# Patient Record
Sex: MALE | Race: WHITE | Employment: OTHER | URBAN - METROPOLITAN AREA
[De-identification: names, ages, dates, MRNs, and addresses within clinical notes are randomized per-mention and may not be internally consistent; named-entity substitution may affect disease eponyms.]

---

## 2020-07-21 PROBLEM — H40.10X0: Status: ACTIVE | Noted: 2020-07-21

## 2020-09-25 PROCEDURE — 88304 TISSUE EXAM BY PATHOLOGIST: CPT | Performed by: ORTHOPAEDIC SURGERY

## 2021-03-01 PROBLEM — R01.1 HEART MURMUR, SYSTOLIC: Status: ACTIVE | Noted: 2021-03-01

## 2021-03-01 PROBLEM — I71.40 ABDOMINAL AORTIC ANEURYSM (AAA) WITHOUT RUPTURE (HCC): Status: ACTIVE | Noted: 2021-03-01

## 2021-03-01 PROBLEM — I71.40 ABDOMINAL AORTIC ANEURYSM (AAA) WITHOUT RUPTURE: Status: ACTIVE | Noted: 2021-03-01

## 2021-03-01 PROBLEM — I71.4 ABDOMINAL AORTIC ANEURYSM (AAA) WITHOUT RUPTURE (HCC): Status: ACTIVE | Noted: 2021-03-01

## 2021-07-06 ENCOUNTER — HOSPITAL ENCOUNTER (INPATIENT)
Facility: HOSPITAL | Age: 81
LOS: 2 days | Discharge: HOME | DRG: 243 | End: 2021-07-08
Attending: EMERGENCY MEDICINE | Admitting: INTERNAL MEDICINE
Payer: MEDICARE

## 2021-07-06 ENCOUNTER — APPOINTMENT (OUTPATIENT)
Dept: GENERAL RADIOLOGY | Facility: HOSPITAL | Age: 81
DRG: 243 | End: 2021-07-06
Attending: EMERGENCY MEDICINE
Payer: MEDICARE

## 2021-07-06 DIAGNOSIS — I45.5 SINUS PAUSE: Primary | ICD-10-CM

## 2021-07-06 DIAGNOSIS — J44.9 CHRONIC OBSTRUCTIVE PULMONARY DISEASE, UNSPECIFIED COPD TYPE (HCC): ICD-10-CM

## 2021-07-06 DIAGNOSIS — R55 SYNCOPE, UNSPECIFIED SYNCOPE TYPE: ICD-10-CM

## 2021-07-06 DIAGNOSIS — I10 BENIGN ESSENTIAL HTN: ICD-10-CM

## 2021-07-06 LAB
ALBUMIN SERPL-MCNC: 3.6 G/DL (ref 3.4–5)
ALBUMIN/GLOB SERPL: 1.1 {RATIO} (ref 1–2)
ALP LIVER SERPL-CCNC: 74 U/L
ALT SERPL-CCNC: 16 U/L
ANION GAP SERPL CALC-SCNC: 6 MMOL/L (ref 0–18)
AST SERPL-CCNC: 17 U/L (ref 15–37)
BASOPHILS # BLD AUTO: 0.04 X10(3) UL (ref 0–0.2)
BASOPHILS NFR BLD AUTO: 0.7 %
BILIRUB SERPL-MCNC: 0.4 MG/DL (ref 0.1–2)
BUN BLD-MCNC: 20 MG/DL (ref 7–18)
BUN/CREAT SERPL: 10 (ref 10–20)
CALCIUM BLD-MCNC: 9.3 MG/DL (ref 8.5–10.1)
CHLORIDE SERPL-SCNC: 104 MMOL/L (ref 98–112)
CO2 SERPL-SCNC: 28 MMOL/L (ref 21–32)
CREAT BLD-MCNC: 2 MG/DL
DEPRECATED RDW RBC AUTO: 45.6 FL (ref 35.1–46.3)
EOSINOPHIL # BLD AUTO: 0.24 X10(3) UL (ref 0–0.7)
EOSINOPHIL NFR BLD AUTO: 4 %
ERYTHROCYTE [DISTWIDTH] IN BLOOD BY AUTOMATED COUNT: 12.2 % (ref 11–15)
GLOBULIN PLAS-MCNC: 3.4 G/DL (ref 2.8–4.4)
GLUCOSE BLD-MCNC: 110 MG/DL (ref 70–99)
HCT VFR BLD AUTO: 40.4 %
HGB BLD-MCNC: 13.9 G/DL
IMM GRANULOCYTES # BLD AUTO: 0.02 X10(3) UL (ref 0–1)
IMM GRANULOCYTES NFR BLD: 0.3 %
LYMPHOCYTES # BLD AUTO: 1.16 X10(3) UL (ref 1–4)
LYMPHOCYTES NFR BLD AUTO: 19.5 %
M PROTEIN MFR SERPL ELPH: 7 G/DL (ref 6.4–8.2)
MCH RBC QN AUTO: 34.8 PG (ref 26–34)
MCHC RBC AUTO-ENTMCNC: 34.4 G/DL (ref 31–37)
MCV RBC AUTO: 101 FL
MONOCYTES # BLD AUTO: 0.51 X10(3) UL (ref 0.1–1)
MONOCYTES NFR BLD AUTO: 8.6 %
NEUTROPHILS # BLD AUTO: 3.97 X10 (3) UL (ref 1.5–7.7)
NEUTROPHILS # BLD AUTO: 3.97 X10(3) UL (ref 1.5–7.7)
NEUTROPHILS NFR BLD AUTO: 66.9 %
OSMOLALITY SERPL CALC.SUM OF ELEC: 289 MOSM/KG (ref 275–295)
PLATELET # BLD AUTO: 273 10(3)UL (ref 150–450)
POTASSIUM SERPL-SCNC: 4.7 MMOL/L (ref 3.5–5.1)
RBC # BLD AUTO: 4 X10(6)UL
SARS-COV-2 RNA RESP QL NAA+PROBE: NOT DETECTED
SODIUM SERPL-SCNC: 138 MMOL/L (ref 136–145)
TROPONIN I SERPL-MCNC: <0.045 NG/ML (ref ?–0.04)
WBC # BLD AUTO: 5.9 X10(3) UL (ref 4–11)

## 2021-07-06 PROCEDURE — 96361 HYDRATE IV INFUSION ADD-ON: CPT

## 2021-07-06 PROCEDURE — 85025 COMPLETE CBC W/AUTO DIFF WBC: CPT | Performed by: EMERGENCY MEDICINE

## 2021-07-06 PROCEDURE — 99285 EMERGENCY DEPT VISIT HI MDM: CPT

## 2021-07-06 PROCEDURE — 80053 COMPREHEN METABOLIC PANEL: CPT | Performed by: EMERGENCY MEDICINE

## 2021-07-06 PROCEDURE — 84484 ASSAY OF TROPONIN QUANT: CPT | Performed by: EMERGENCY MEDICINE

## 2021-07-06 PROCEDURE — 93005 ELECTROCARDIOGRAM TRACING: CPT

## 2021-07-06 PROCEDURE — 96360 HYDRATION IV INFUSION INIT: CPT

## 2021-07-06 PROCEDURE — 93010 ELECTROCARDIOGRAM REPORT: CPT

## 2021-07-06 PROCEDURE — 71045 X-RAY EXAM CHEST 1 VIEW: CPT | Performed by: EMERGENCY MEDICINE

## 2021-07-06 RX ORDER — DOCUSATE SODIUM 100 MG/1
100 CAPSULE, LIQUID FILLED ORAL 2 TIMES DAILY
Status: DISCONTINUED | OUTPATIENT
Start: 2021-07-06 | End: 2021-07-08

## 2021-07-06 RX ORDER — ONDANSETRON 2 MG/ML
4 INJECTION INTRAMUSCULAR; INTRAVENOUS EVERY 6 HOURS PRN
Status: DISCONTINUED | OUTPATIENT
Start: 2021-07-06 | End: 2021-07-07

## 2021-07-06 RX ORDER — SODIUM CHLORIDE 9 MG/ML
125 INJECTION, SOLUTION INTRAVENOUS CONTINUOUS
Status: DISCONTINUED | OUTPATIENT
Start: 2021-07-06 | End: 2021-07-07

## 2021-07-06 RX ORDER — POLYETHYLENE GLYCOL 3350 17 G/17G
17 POWDER, FOR SOLUTION ORAL DAILY PRN
Status: DISCONTINUED | OUTPATIENT
Start: 2021-07-06 | End: 2021-07-08

## 2021-07-06 RX ORDER — DORZOLAMIDE HYDROCHLORIDE AND TIMOLOL MALEATE 20; 5 MG/ML; MG/ML
1 SOLUTION/ DROPS OPHTHALMIC 2 TIMES DAILY
Status: DISCONTINUED | OUTPATIENT
Start: 2021-07-06 | End: 2021-07-06

## 2021-07-06 RX ORDER — PREDNISONE 50 MG/1
50 TABLET ORAL EVERY 6 HOURS
Status: DISPENSED | OUTPATIENT
Start: 2021-07-07 | End: 2021-07-07

## 2021-07-06 RX ORDER — METOCLOPRAMIDE HYDROCHLORIDE 5 MG/ML
5 INJECTION INTRAMUSCULAR; INTRAVENOUS EVERY 8 HOURS PRN
Status: DISCONTINUED | OUTPATIENT
Start: 2021-07-06 | End: 2021-07-08

## 2021-07-06 RX ORDER — DIPHENHYDRAMINE HCL 50 MG
50 CAPSULE ORAL ONCE
Status: COMPLETED | OUTPATIENT
Start: 2021-07-07 | End: 2021-07-07

## 2021-07-06 RX ORDER — PANTOPRAZOLE SODIUM 20 MG/1
20 TABLET, DELAYED RELEASE ORAL
Status: DISCONTINUED | OUTPATIENT
Start: 2021-07-07 | End: 2021-07-08

## 2021-07-06 RX ORDER — CEFAZOLIN SODIUM/WATER 2 G/20 ML
2 SYRINGE (ML) INTRAVENOUS
Status: DISCONTINUED | OUTPATIENT
Start: 2021-07-07 | End: 2021-07-07 | Stop reason: HOSPADM

## 2021-07-06 RX ORDER — AMLODIPINE BESYLATE 10 MG/1
10 TABLET ORAL DAILY
Status: ON HOLD | COMMUNITY
Start: 2021-06-18 | End: 2021-09-07

## 2021-07-06 RX ORDER — LATANOPROST 50 UG/ML
1 SOLUTION/ DROPS OPHTHALMIC NIGHTLY
Status: DISCONTINUED | OUTPATIENT
Start: 2021-07-07 | End: 2021-07-08

## 2021-07-06 RX ORDER — ACETAMINOPHEN 325 MG/1
650 TABLET ORAL EVERY 6 HOURS PRN
Status: DISCONTINUED | OUTPATIENT
Start: 2021-07-06 | End: 2021-07-07

## 2021-07-06 RX ORDER — CHLORHEXIDINE GLUCONATE 4 G/100ML
30 SOLUTION TOPICAL
Status: COMPLETED | OUTPATIENT
Start: 2021-07-07 | End: 2021-07-07

## 2021-07-06 RX ORDER — BISACODYL 10 MG
10 SUPPOSITORY, RECTAL RECTAL
Status: DISCONTINUED | OUTPATIENT
Start: 2021-07-06 | End: 2021-07-08

## 2021-07-06 RX ORDER — METOPROLOL SUCCINATE 50 MG/1
50 TABLET, EXTENDED RELEASE ORAL DAILY
Status: ON HOLD | COMMUNITY
Start: 2021-06-18 | End: 2021-09-07

## 2021-07-06 RX ORDER — LATANOPROST 50 UG/ML
1 SOLUTION/ DROPS OPHTHALMIC 2 TIMES DAILY
Status: DISCONTINUED | OUTPATIENT
Start: 2021-07-06 | End: 2021-07-06 | Stop reason: SDUPTHER

## 2021-07-06 NOTE — CONSULTS
[address] [facility] Cardiology  Consultation Note      Arley Lopez.  Patient Status:  Emergency    1940 MRN [id number]   Location 656 [facility] Attending Maty Johnson MD   Hosp Day # 0 PCP Derek Soriano MD     Outp radical prostatectomy       Family History  family history is not on file. Social History   reports that he quit smoking about 36 years ago. His smoking use included cigarettes. He started smoking about 62 years ago. He smoked 1.00 pack per day.  He has nick    Diagnostic testing:    EKG: Normal sinus rhythm    Labs:   No results found for: PT, INR            Thank you for allowing our practice to participate in the care of your patient. Please do not hesitate to contact me if you have any questions.

## 2021-07-06 NOTE — ED PROVIDER NOTES
Patient Seen in: [facility] Emergency Department      History   Patient presents with:  Arrythmia/Palpitations    Stated Complaint: arrhythmia, lightheaded    HPI/Subjective:   HPI    69-year-old male presents to the emergency department after an ev nontender. Extremities: No clubbing claudication or edema. No cords or calf tenderness. Skin is dry without rashes or lesions. Neuro exam: Awake, conversive and moving all 4 extremities well.     ED Course     Labs Reviewed   COMP METABOLIC PANEL (14) with cardiologist Dr. Lorie Abdullahi and [facility] hospitalist Dr. Moon Engle. Treatment plan was discussed with patient and his son. Patient will be admitted to washout his beta-blocker and determine whether he requires pacemaker. MDM      #1.   Sinus taylor

## 2021-07-07 ENCOUNTER — APPOINTMENT (OUTPATIENT)
Dept: INTERVENTIONAL RADIOLOGY/VASCULAR | Facility: HOSPITAL | Age: 81
DRG: 243 | End: 2021-07-07
Attending: INTERNAL MEDICINE
Payer: MEDICARE

## 2021-07-07 ENCOUNTER — HOSPITAL ENCOUNTER (INPATIENT)
Dept: GENERAL RADIOLOGY | Facility: HOSPITAL | Age: 81
Discharge: HOME | DRG: 243 | End: 2021-07-07
Attending: INTERNAL MEDICINE
Payer: MEDICARE

## 2021-07-07 LAB
ANION GAP SERPL CALC-SCNC: 4 MMOL/L (ref 0–18)
ATRIAL RATE: 73 BPM
BASOPHILS # BLD AUTO: 0.03 X10(3) UL (ref 0–0.2)
BASOPHILS NFR BLD AUTO: 0.4 %
BUN BLD-MCNC: 23 MG/DL (ref 7–18)
BUN/CREAT SERPL: 14.6 (ref 10–20)
CALCIUM BLD-MCNC: 9 MG/DL (ref 8.5–10.1)
CHLORIDE SERPL-SCNC: 107 MMOL/L (ref 98–112)
CO2 SERPL-SCNC: 26 MMOL/L (ref 21–32)
CREAT BLD-MCNC: 1.57 MG/DL
DEPRECATED RDW RBC AUTO: 43.8 FL (ref 35.1–46.3)
EOSINOPHIL # BLD AUTO: 0.01 X10(3) UL (ref 0–0.7)
EOSINOPHIL NFR BLD AUTO: 0.1 %
ERYTHROCYTE [DISTWIDTH] IN BLOOD BY AUTOMATED COUNT: 12.1 % (ref 11–15)
GLUCOSE BLD-MCNC: 157 MG/DL (ref 70–99)
HCT VFR BLD AUTO: 40 %
HGB BLD-MCNC: 14.2 G/DL
IMM GRANULOCYTES # BLD AUTO: 0.02 X10(3) UL (ref 0–1)
IMM GRANULOCYTES NFR BLD: 0.3 %
LYMPHOCYTES # BLD AUTO: 0.66 X10(3) UL (ref 1–4)
LYMPHOCYTES NFR BLD AUTO: 9 %
MCH RBC QN AUTO: 35.1 PG (ref 26–34)
MCHC RBC AUTO-ENTMCNC: 35.5 G/DL (ref 31–37)
MCV RBC AUTO: 98.8 FL
MONOCYTES # BLD AUTO: 0.08 X10(3) UL (ref 0.1–1)
MONOCYTES NFR BLD AUTO: 1.1 %
NEUTROPHILS # BLD AUTO: 6.53 X10 (3) UL (ref 1.5–7.7)
NEUTROPHILS # BLD AUTO: 6.53 X10(3) UL (ref 1.5–7.7)
NEUTROPHILS NFR BLD AUTO: 89.1 %
OSMOLALITY SERPL CALC.SUM OF ELEC: 291 MOSM/KG (ref 275–295)
P AXIS: 66 DEGREES
P-R INTERVAL: 174 MS
PLATELET # BLD AUTO: 246 10(3)UL (ref 150–450)
POTASSIUM SERPL-SCNC: 4.2 MMOL/L (ref 3.5–5.1)
Q-T INTERVAL: 414 MS
QRS DURATION: 130 MS
QTC CALCULATION (BEZET): 456 MS
R AXIS: -82 DEGREES
RBC # BLD AUTO: 4.05 X10(6)UL
SODIUM SERPL-SCNC: 137 MMOL/L (ref 136–145)
T AXIS: 47 DEGREES
VENTRICULAR RATE: 73 BPM
WBC # BLD AUTO: 7.3 X10(3) UL (ref 4–11)

## 2021-07-07 PROCEDURE — 94640 AIRWAY INHALATION TREATMENT: CPT

## 2021-07-07 PROCEDURE — B517YZZ FLUOROSCOPY OF LEFT SUBCLAVIAN VEIN USING OTHER CONTRAST: ICD-10-PCS | Performed by: INTERNAL MEDICINE

## 2021-07-07 PROCEDURE — 02H63JZ INSERTION OF PACEMAKER LEAD INTO RIGHT ATRIUM, PERCUTANEOUS APPROACH: ICD-10-PCS | Performed by: INTERNAL MEDICINE

## 2021-07-07 PROCEDURE — 99152 MOD SED SAME PHYS/QHP 5/>YRS: CPT

## 2021-07-07 PROCEDURE — 80048 BASIC METABOLIC PNL TOTAL CA: CPT | Performed by: INTERNAL MEDICINE

## 2021-07-07 PROCEDURE — 99153 MOD SED SAME PHYS/QHP EA: CPT

## 2021-07-07 PROCEDURE — 71046 X-RAY EXAM CHEST 2 VIEWS: CPT | Performed by: INTERNAL MEDICINE

## 2021-07-07 PROCEDURE — 33208 INSRT HEART PM ATRIAL & VENT: CPT

## 2021-07-07 PROCEDURE — 3E0102A INTRODUCTION OF ANTI-INFECTIVE ENVELOPE INTO SUBCUTANEOUS TISSUE, OPEN APPROACH: ICD-10-PCS | Performed by: INTERNAL MEDICINE

## 2021-07-07 PROCEDURE — 0JH606Z INSERTION OF PACEMAKER, DUAL CHAMBER INTO CHEST SUBCUTANEOUS TISSUE AND FASCIA, OPEN APPROACH: ICD-10-PCS | Performed by: INTERNAL MEDICINE

## 2021-07-07 PROCEDURE — 02HK3JZ INSERTION OF PACEMAKER LEAD INTO RIGHT VENTRICLE, PERCUTANEOUS APPROACH: ICD-10-PCS | Performed by: INTERNAL MEDICINE

## 2021-07-07 PROCEDURE — 85025 COMPLETE CBC W/AUTO DIFF WBC: CPT | Performed by: INTERNAL MEDICINE

## 2021-07-07 RX ORDER — BUPIVACAINE HYDROCHLORIDE 5 MG/ML
INJECTION, SOLUTION EPIDURAL; INTRACAUDAL
Status: COMPLETED
Start: 2021-07-07 | End: 2021-07-07

## 2021-07-07 RX ORDER — ACETAMINOPHEN AND CODEINE PHOSPHATE 300; 30 MG/1; MG/1
1 TABLET ORAL EVERY 4 HOURS PRN
Status: DISCONTINUED | OUTPATIENT
Start: 2021-07-07 | End: 2021-07-08

## 2021-07-07 RX ORDER — VANCOMYCIN HYDROCHLORIDE 1 G/20ML
INJECTION, POWDER, LYOPHILIZED, FOR SOLUTION INTRAVENOUS
Status: COMPLETED
Start: 2021-07-07 | End: 2021-07-07

## 2021-07-07 RX ORDER — METHYLPREDNISOLONE SODIUM SUCCINATE 125 MG/2ML
INJECTION, POWDER, LYOPHILIZED, FOR SOLUTION INTRAMUSCULAR; INTRAVENOUS
Status: COMPLETED
Start: 2021-07-07 | End: 2021-07-07

## 2021-07-07 RX ORDER — METOPROLOL SUCCINATE 50 MG/1
50 TABLET, EXTENDED RELEASE ORAL ONCE
Status: COMPLETED | OUTPATIENT
Start: 2021-07-07 | End: 2021-07-07

## 2021-07-07 RX ORDER — DIPHENHYDRAMINE HYDROCHLORIDE 50 MG/ML
INJECTION INTRAMUSCULAR; INTRAVENOUS
Status: COMPLETED
Start: 2021-07-07 | End: 2021-07-07

## 2021-07-07 RX ORDER — LIDOCAINE HYDROCHLORIDE 10 MG/ML
INJECTION, SOLUTION EPIDURAL; INFILTRATION; INTRACAUDAL; PERINEURAL
Status: COMPLETED
Start: 2021-07-07 | End: 2021-07-07

## 2021-07-07 RX ORDER — ACETAMINOPHEN 325 MG/1
650 TABLET ORAL EVERY 4 HOURS PRN
Status: DISCONTINUED | OUTPATIENT
Start: 2021-07-07 | End: 2021-07-08

## 2021-07-07 RX ORDER — ONDANSETRON 2 MG/ML
4 INJECTION INTRAMUSCULAR; INTRAVENOUS EVERY 6 HOURS PRN
Status: DISCONTINUED | OUTPATIENT
Start: 2021-07-07 | End: 2021-07-08

## 2021-07-07 RX ORDER — CEFAZOLIN SODIUM/WATER 2 G/20 ML
SYRINGE (ML) INTRAVENOUS
Status: COMPLETED
Start: 2021-07-07 | End: 2021-07-07

## 2021-07-07 RX ORDER — CEFAZOLIN SODIUM/WATER 2 G/20 ML
2 SYRINGE (ML) INTRAVENOUS EVERY 8 HOURS
Status: COMPLETED | OUTPATIENT
Start: 2021-07-07 | End: 2021-07-08

## 2021-07-07 RX ORDER — AMLODIPINE BESYLATE 5 MG/1
5 TABLET ORAL DAILY
Status: DISCONTINUED | OUTPATIENT
Start: 2021-07-07 | End: 2021-07-08

## 2021-07-07 RX ORDER — ACETAMINOPHEN AND CODEINE PHOSPHATE 300; 30 MG/1; MG/1
2 TABLET ORAL EVERY 4 HOURS PRN
Status: DISCONTINUED | OUTPATIENT
Start: 2021-07-07 | End: 2021-07-08

## 2021-07-07 RX ORDER — MIDAZOLAM HYDROCHLORIDE 1 MG/ML
INJECTION INTRAMUSCULAR; INTRAVENOUS
Status: COMPLETED
Start: 2021-07-07 | End: 2021-07-07

## 2021-07-07 NOTE — PROGRESS NOTES
[facility] Hospitalist Progress Note                                                                   Hernan 46.  2/12/1940    SUBJECTIVE:  Doing well sp PPM.  Pain minimal.      OBJECTIVE: pause  # p afib  # bifasicular block  - sp PPM today  - hold BB     # HTN  - cont amlodipine    # GERD  - cont ppi     # contrast allergy  - given po pred and benadryl per protocol      Arabella [facility] Hospitalist  Pager: [phone]

## 2021-07-07 NOTE — CONSULTS
[facility] Electrophysiology Consult      Celio Estimable.  Patient Status:  Inpatient    1940 MRN [id number]   [facility] 8NE-A Attending Vazquez Paz MD   Hosp Day # 0 PCP MD Celio Boggs Estimable. is mouth daily. , Disp: , Rfl:   Ascorbic Acid (VITAMIN C OR), Take 1 tablet by mouth daily.   , Disp: , Rfl:   omeprazole 20 MG Oral Capsule Delayed Release, Take 20 mg by mouth every morning before breakfast., Disp: , Rfl:   Dorzolamide HCl-Timolol Mal PF 2 active BS, soft, nondistended; nontender  EXTREMITIES: no cyanosis, clubbing or edema, peripheral pulses intact  NEURO: no sensorimotor deficits  PSYCHIATRIC: alert and oriented x 3, affect normal  SKIN: no rashes    Data:  · EC2021: SR 76 RBBB/LAFB

## 2021-07-07 NOTE — PLAN OF CARE
Alert and oriented x4 on tele monitor hr 70's sinus rhythm. Instructed npo after mn for pacemaker placement in am and verbalized understanding. Consent signed. Denies any pain. All needs attended and will continue to monitor. Call light within reach.   Prob

## 2021-07-07 NOTE — PROGRESS NOTES
07/07/21 1053   Clinical Encounter Type   Referral To Nurse  ( received Advance Directive consult. Current code status is \"Not on file. \" Please refer to physician for code status update.  Thank you.)   After a physician's order for DNAR has bee

## 2021-07-07 NOTE — PROGRESS NOTES
07/07/21 1052   Clinical Encounter Type   Visited With Patient   Surgical Visit Post-op   Referral To Nurse  ( provided HPOA form to patient who would like some time to review the form.)   Patient Spiritual Encounters   Spiritual Interventions C

## 2021-07-07 NOTE — H&P
[facility] Hospitalist History and Physical      Patient presents with:  Arrythmia/Palpitations       PCP: Sweetie Palomino MD      History of Present Illness: Patient is a 79year old male with PMH sig for HTN, COPD, presents for eval of lightheadedness, presyncope General:  Alert, no distress, appears stated age. Head:  Normocephalic, without obvious abnormality, atraumatic. Eyes:  Sclera anicteric, No conjunctival pallor, EOMs intact. Nose: Nares normal. Septum midline. Mucosa normal. No drainage.    Gray Clam (prevention of bradycardia), and alternatives (no device) of the procedure were discussed. The patient understands and agrees to proceed. Procedure: The patient was brought to the electrophysiology laboratory in a fasting and nonsedated state.  The left ajy CARD ECHO 2D DOPPLER (CPT=93306)    Result Date: 6/25/2021  ---------------------------------------------------------------------------- Transthoracic Echocardiogram 2D Echo with Doppler Patient:        Veronica Francheska Date: ---------  LVOT area                               3     cm^2   ---------  LVOT ID                                 2.1   cm     ---------   Aorta                                   Value        Reference  Aortic root ID, ED                      3.7   cm ---------------------------------------------------------------------------- Findings Left ventricle: The cavity size is normal. Wall thickness is mildly increased. Systolic function is normal. The estimated ejection fraction is 55-60%.  Wall motion is nor Wall motion is normal; there are no regional wall motion    abnormalities. Diastolic function is indeterminate. 2. Right ventricle: Estimation of the right ventricular systolic pressure is    mildly increased. RV systolic pressure (S, est): 36mm Hg.  3. Tri

## 2021-07-07 NOTE — PROGRESS NOTES
07/07/21 1053   Clinical Encounter Type   Referral To Nurse  ( received Advance Directive consult. Current code status is \"Not on file. \" Please refer to physician for code status update.  Thank you.)

## 2021-07-07 NOTE — PLAN OF CARE
Pt denies c/o pain, malaise, or cardiac symptoms. A&Ox4. Lungs clear bilaterally with equal expansion, on room air. Pt NSR on monitor with regular rate. Abdomen soft and non-tender with active bowel sounds in all four quadrants. Continent of B&B.  Pacemaker Mar Blake RN  Outcome: Progressing  Goal: Absence of cardiac arrhythmias or at baseline  Description: INTERVENTIONS:  - Continuous cardiac monitoring, monitor vital signs, obtain 12 lead EKG if indicated  - Evaluate effectiveness of antiarrhythmic and heart

## 2021-07-07 NOTE — PLAN OF CARE
Pt denies c/o pain, malaise, or cardiac symptoms. A&Ox4. Lungs clear bilaterally with equal expansion, on room air. Pt NSR on monitor with regular rate. Abdomen soft and non-tender with active bowel sounds in all four quadrants. Continent of B&B.  Pt update

## 2021-07-07 NOTE — PROCEDURES
Pacemaker Implantation      History:  79year old male with paroxysmal atrial fibrillation with symptomatic post conversion pauses who presents for a dual chamber pacemaker implant.  The risks (including, but not limited to, hematoma, infection, pneumothora Number Serial Number Sensing(mV) Impedance Pacing   Generator Medtronic [id number]      RA Medtronic [id number] 1.8 817 0.75V @ 0.4ms   RV Medtronic [id number] 5.4 893 0.5V @ 0.4ms       Conclusions:  · Successful dual chamber pacema

## 2021-07-07 NOTE — PROGRESS NOTES
Cardiology follow-up  Patient post pacemaker. Interrogated by Latio rep with normal function. Patient has been hypertensive. Will restart amlodipine and metoprolol, will give today's dose now. Following CXR, will discharge patient.   Follow-up in 1 a

## 2021-07-08 VITALS
BODY MASS INDEX: 22 KG/M2 | WEIGHT: 156.06 LBS | OXYGEN SATURATION: 98 % | HEART RATE: 70 BPM | RESPIRATION RATE: 16 BRPM | SYSTOLIC BLOOD PRESSURE: 144 MMHG | DIASTOLIC BLOOD PRESSURE: 74 MMHG | TEMPERATURE: 98 F

## 2021-07-08 PROCEDURE — 4B02XSZ MEASUREMENT OF CARDIAC PACEMAKER, EXTERNAL APPROACH: ICD-10-PCS | Performed by: INTERNAL MEDICINE

## 2021-07-08 RX ORDER — AMLODIPINE BESYLATE 5 MG/1
5 TABLET ORAL ONCE
Status: COMPLETED | OUTPATIENT
Start: 2021-07-08 | End: 2021-07-08

## 2021-07-08 RX ORDER — AMLODIPINE BESYLATE 5 MG/1
10 TABLET ORAL DAILY
Status: DISCONTINUED | OUTPATIENT
Start: 2021-07-08 | End: 2021-07-08

## 2021-07-08 RX ORDER — ASPIRIN 81 MG/1
TABLET ORAL
Qty: 100 TABLET | Refills: 3 | Status: SHIPPED | OUTPATIENT
Start: 2021-07-08 | End: 2021-08-25

## 2021-07-08 NOTE — PLAN OF CARE
Patient is s/p dual chamber pacemaker. Mepilex to left upper chest clean/dry/intact, small bruising noted. Sling to left upper extremity in place. Cardiology to address anticoagulation before discharge in the AM. Normal sinus rhythm on telemetry.  Continent

## 2021-07-08 NOTE — PROGRESS NOTES
[facility] Cardiology Progress Note - Alberto De La O.  Patient Status:  Inpatient    1940 MRN [id number]   [facility] 8NE-A Attending Maya Mendoza, [facility] Day # 2 PCP Mena Sharma MD     Subjective:  Angel Tristan NSR    Physical Exam:    General: Alert and oriented x 3. No apparent distress. No respiratory or constitutional distress. HEENT: Normocephalic, anicteric sclera, neck supple, no thyromegaly or adenopathy. Neck: No JVD, carotids 2+, no bruits.   Cardiac: Intravenous, Q8H PRN  Fluticasone Furoate-Vilanterol (BREO ELLIPTA) 200-25 MCG/INH inhaler 1 puff, 1 puff, Inhalation, Daily  Pantoprazole Sodium (PROTONIX) EC tab 20 mg, 20 mg, Oral, QAM AC  latanoprost (XALATAN) 0.005 % ophthalmic solution 1 drop, 1 drop

## 2021-07-30 PROBLEM — Z95.0 PACEMAKER: Status: ACTIVE | Noted: 2021-07-30

## 2021-09-05 ENCOUNTER — APPOINTMENT (OUTPATIENT)
Dept: GENERAL RADIOLOGY | Facility: HOSPITAL | Age: 81
DRG: 177 | End: 2021-09-05
Attending: EMERGENCY MEDICINE
Payer: MEDICARE

## 2021-09-05 ENCOUNTER — HOSPITAL ENCOUNTER (INPATIENT)
Facility: HOSPITAL | Age: 81
LOS: 1 days | Discharge: HOME HEALTH | DRG: 177 | End: 2021-09-07
Attending: EMERGENCY MEDICINE | Admitting: INTERNAL MEDICINE
Payer: MEDICARE

## 2021-09-05 DIAGNOSIS — N17.9 AKI (ACUTE KIDNEY INJURY) (HCC): ICD-10-CM

## 2021-09-05 DIAGNOSIS — R00.0 TACHYCARDIA: ICD-10-CM

## 2021-09-05 DIAGNOSIS — U07.1 COVID-19: Primary | ICD-10-CM

## 2021-09-05 DIAGNOSIS — I48.92 NEW ONSET ATRIAL FLUTTER (HCC): ICD-10-CM

## 2021-09-05 PROBLEM — E87.3 RESPIRATORY ALKALOSIS: Status: ACTIVE | Noted: 2021-09-05

## 2021-09-05 LAB
ALBUMIN SERPL-MCNC: 3.3 G/DL (ref 3.4–5)
ALBUMIN/GLOB SERPL: 0.6 {RATIO} (ref 1–2)
ALP LIVER SERPL-CCNC: 67 U/L
ALT SERPL-CCNC: 25 U/L
ANION GAP SERPL CALC-SCNC: 13 MMOL/L (ref 0–18)
APTT PPP: 35 SECONDS (ref 25.4–36.1)
ARTERIAL PATENCY WRIST A: POSITIVE
AST SERPL-CCNC: 47 U/L (ref 15–37)
BASE EXCESS BLDA CALC-SCNC: -10 MMOL/L (ref ?–2)
BASOPHILS # BLD AUTO: 0.02 X10(3) UL (ref 0–0.2)
BASOPHILS NFR BLD AUTO: 0.2 %
BILIRUB SERPL-MCNC: 0.5 MG/DL (ref 0.1–2)
BODY TEMPERATURE: 98.6 F
BUN BLD-MCNC: 41 MG/DL (ref 7–18)
CA-I BLD-SCNC: 1.13 MMOL/L (ref 1.12–1.32)
CALCIUM BLD-MCNC: 9.1 MG/DL (ref 8.5–10.1)
CHLORIDE SERPL-SCNC: 102 MMOL/L (ref 98–112)
CO2 SERPL-SCNC: 19 MMOL/L (ref 21–32)
COHGB MFR BLD: 0.9 % SAT (ref 0–3)
CREAT BLD-MCNC: 2.67 MG/DL
EOSINOPHIL # BLD AUTO: 0.01 X10(3) UL (ref 0–0.7)
EOSINOPHIL NFR BLD AUTO: 0.1 %
ERYTHROCYTE [DISTWIDTH] IN BLOOD BY AUTOMATED COUNT: 12.1 %
GLOBULIN PLAS-MCNC: 5.4 G/DL (ref 2.8–4.4)
GLUCOSE BLD-MCNC: 127 MG/DL (ref 70–99)
HCO3 BLDA-SCNC: 13 MEQ/L (ref 22–26)
HCT VFR BLD AUTO: 46.6 %
HGB BLD-MCNC: 14.4 G/DL
HGB BLD-MCNC: 16.1 G/DL
IMM GRANULOCYTES # BLD AUTO: 0.04 X10(3) UL (ref 0–1)
IMM GRANULOCYTES NFR BLD: 0.4 %
L/M: 15 L/MIN
LACTATE BLDA-SCNC: 1.8 MMOL/L (ref 0.5–2)
LYMPHOCYTES # BLD AUTO: 1.46 X10(3) UL (ref 1–4)
LYMPHOCYTES NFR BLD AUTO: 15.5 %
M PROTEIN MFR SERPL ELPH: 8.7 G/DL (ref 6.4–8.2)
MCH RBC QN AUTO: 33.5 PG (ref 26–34)
MCHC RBC AUTO-ENTMCNC: 34.5 G/DL (ref 31–37)
MCV RBC AUTO: 96.9 FL
METHGB MFR BLD: 0.5 % SAT (ref 0.4–1.5)
MONOCYTES # BLD AUTO: 0.6 X10(3) UL (ref 0.1–1)
MONOCYTES NFR BLD AUTO: 6.4 %
NEUTROPHILS # BLD AUTO: 7.31 X10 (3) UL (ref 1.5–7.7)
NEUTROPHILS # BLD AUTO: 7.31 X10(3) UL (ref 1.5–7.7)
NEUTROPHILS NFR BLD AUTO: 77.4 %
NT-PROBNP SERPL-MCNC: 6384 PG/ML (ref ?–450)
OSMOLALITY SERPL CALC.SUM OF ELEC: 290 MOSM/KG (ref 275–295)
P/F RATIO: 152.8 MMHG
PCO2 BLDA: 23 MM HG (ref 35–45)
PH BLDA: 7.37 [PH] (ref 7.35–7.45)
PLATELET # BLD AUTO: 274 10(3)UL (ref 150–450)
PO2 BLDA: 153 MM HG (ref 80–105)
POTASSIUM BLD-SCNC: 3.6 MMOL/L (ref 3.6–5.1)
POTASSIUM SERPL-SCNC: 4 MMOL/L (ref 3.5–5.1)
RBC # BLD AUTO: 4.81 X10(6)UL
SAO2 % BLDA FROM PO2: 99 % (ref 92–100)
SAO2 % BLDA: 98 % (ref 92–100)
SARS-COV-2 RNA RESP QL NAA+PROBE: DETECTED
SODIUM BLD-SCNC: 137 MMOL/L (ref 136–144)
SODIUM SERPL-SCNC: 134 MMOL/L (ref 136–145)
TROPONIN I SERPL-MCNC: 0.04 NG/ML (ref ?–0.04)
WBC # BLD AUTO: 9.4 X10(3) UL (ref 4–11)

## 2021-09-05 PROCEDURE — 83605 ASSAY OF LACTIC ACID: CPT | Performed by: EMERGENCY MEDICINE

## 2021-09-05 PROCEDURE — 82652 VIT D 1 25-DIHYDROXY: CPT | Performed by: INTERNAL MEDICINE

## 2021-09-05 PROCEDURE — 85730 THROMBOPLASTIN TIME PARTIAL: CPT | Performed by: EMERGENCY MEDICINE

## 2021-09-05 PROCEDURE — 36600 WITHDRAWAL OF ARTERIAL BLOOD: CPT | Performed by: EMERGENCY MEDICINE

## 2021-09-05 PROCEDURE — 96365 THER/PROPH/DIAG IV INF INIT: CPT

## 2021-09-05 PROCEDURE — XW033G6 INTRODUCTION OF REGN-COV2 MONOCLONAL ANTIBODY INTO PERIPHERAL VEIN, PERCUTANEOUS APPROACH, NEW TECHNOLOGY GROUP 6: ICD-10-PCS | Performed by: EMERGENCY MEDICINE

## 2021-09-05 PROCEDURE — 85018 HEMOGLOBIN: CPT | Performed by: EMERGENCY MEDICINE

## 2021-09-05 PROCEDURE — XW033E5 INTRODUCTION OF REMDESIVIR ANTI-INFECTIVE INTO PERIPHERAL VEIN, PERCUTANEOUS APPROACH, NEW TECHNOLOGY GROUP 5: ICD-10-PCS | Performed by: EMERGENCY MEDICINE

## 2021-09-05 PROCEDURE — 93010 ELECTROCARDIOGRAM REPORT: CPT

## 2021-09-05 PROCEDURE — 96366 THER/PROPH/DIAG IV INF ADDON: CPT

## 2021-09-05 PROCEDURE — 82375 ASSAY CARBOXYHB QUANT: CPT | Performed by: EMERGENCY MEDICINE

## 2021-09-05 PROCEDURE — 87040 BLOOD CULTURE FOR BACTERIA: CPT | Performed by: EMERGENCY MEDICINE

## 2021-09-05 PROCEDURE — 84145 PROCALCITONIN (PCT): CPT | Performed by: HOSPITALIST

## 2021-09-05 PROCEDURE — 82803 BLOOD GASES ANY COMBINATION: CPT | Performed by: EMERGENCY MEDICINE

## 2021-09-05 PROCEDURE — 36415 COLL VENOUS BLD VENIPUNCTURE: CPT

## 2021-09-05 PROCEDURE — 71045 X-RAY EXAM CHEST 1 VIEW: CPT | Performed by: EMERGENCY MEDICINE

## 2021-09-05 PROCEDURE — 83880 ASSAY OF NATRIURETIC PEPTIDE: CPT | Performed by: EMERGENCY MEDICINE

## 2021-09-05 PROCEDURE — 96375 TX/PRO/DX INJ NEW DRUG ADDON: CPT

## 2021-09-05 PROCEDURE — 85025 COMPLETE CBC W/AUTO DIFF WBC: CPT | Performed by: EMERGENCY MEDICINE

## 2021-09-05 PROCEDURE — 82330 ASSAY OF CALCIUM: CPT | Performed by: EMERGENCY MEDICINE

## 2021-09-05 PROCEDURE — 99291 CRITICAL CARE FIRST HOUR: CPT

## 2021-09-05 PROCEDURE — 93005 ELECTROCARDIOGRAM TRACING: CPT

## 2021-09-05 PROCEDURE — 84484 ASSAY OF TROPONIN QUANT: CPT | Performed by: EMERGENCY MEDICINE

## 2021-09-05 PROCEDURE — 84295 ASSAY OF SERUM SODIUM: CPT | Performed by: EMERGENCY MEDICINE

## 2021-09-05 PROCEDURE — 84132 ASSAY OF SERUM POTASSIUM: CPT | Performed by: EMERGENCY MEDICINE

## 2021-09-05 PROCEDURE — 83050 HGB METHEMOGLOBIN QUAN: CPT | Performed by: EMERGENCY MEDICINE

## 2021-09-05 PROCEDURE — 80053 COMPREHEN METABOLIC PANEL: CPT | Performed by: EMERGENCY MEDICINE

## 2021-09-05 RX ORDER — ADENOSINE 3 MG/ML
6 INJECTION, SOLUTION INTRAVENOUS ONCE
Status: COMPLETED | OUTPATIENT
Start: 2021-09-05 | End: 2021-09-05

## 2021-09-05 RX ORDER — ASPIRIN 81 MG/1
81 TABLET ORAL EVERY OTHER DAY
Status: ON HOLD | COMMUNITY
End: 2021-09-07

## 2021-09-05 RX ORDER — HEPARIN SODIUM AND DEXTROSE 10000; 5 [USP'U]/100ML; G/100ML
12 INJECTION INTRAVENOUS ONCE
Status: COMPLETED | OUTPATIENT
Start: 2021-09-05 | End: 2021-09-06

## 2021-09-05 RX ORDER — SODIUM CHLORIDE 9 MG/ML
INJECTION, SOLUTION INTRAVENOUS CONTINUOUS
Status: DISCONTINUED | OUTPATIENT
Start: 2021-09-05 | End: 2021-09-07

## 2021-09-05 RX ORDER — DILTIAZEM HYDROCHLORIDE 5 MG/ML
INJECTION INTRAVENOUS
Status: DISPENSED
Start: 2021-09-05 | End: 2021-09-06

## 2021-09-05 RX ORDER — HEPARIN SODIUM 5000 [USP'U]/ML
60 INJECTION INTRAVENOUS; SUBCUTANEOUS ONCE
Status: COMPLETED | OUTPATIENT
Start: 2021-09-05 | End: 2021-09-05

## 2021-09-05 RX ORDER — ADENOSINE 3 MG/ML
INJECTION, SOLUTION INTRAVENOUS
Status: COMPLETED
Start: 2021-09-05 | End: 2021-09-05

## 2021-09-05 RX ORDER — ASPIRIN 81 MG/1
324 TABLET, CHEWABLE ORAL ONCE
Status: COMPLETED | OUTPATIENT
Start: 2021-09-05 | End: 2021-09-05

## 2021-09-05 RX ORDER — LATANOPROST 50 UG/ML
1 SOLUTION/ DROPS OPHTHALMIC NIGHTLY
COMMUNITY

## 2021-09-05 NOTE — ED INITIAL ASSESSMENT (HPI)
Pt reports fatigue, cough, dizziness. Pt reports he fell twice on Tuesday due to dizziness was not seen by a doctor. Pt also reports having diarrhea and shortness of breath for the past 3 days. Cough for 4 days.  Per son pt had pacemaker placed 2 months ago

## 2021-09-06 PROBLEM — I48.92 NEW ONSET ATRIAL FLUTTER (HCC): Status: ACTIVE | Noted: 2021-09-06

## 2021-09-06 PROBLEM — R00.0 TACHYCARDIA: Status: ACTIVE | Noted: 2021-09-06

## 2021-09-06 LAB
ALBUMIN SERPL-MCNC: 2.5 G/DL (ref 3.4–5)
ALBUMIN/GLOB SERPL: 0.6 {RATIO} (ref 1–2)
ALP LIVER SERPL-CCNC: 52 U/L
ALT SERPL-CCNC: 19 U/L
ANION GAP SERPL CALC-SCNC: 13 MMOL/L (ref 0–18)
APTT PPP: 132.2 SECONDS (ref 25.4–36.1)
APTT PPP: 72.6 SECONDS (ref 25.4–36.1)
APTT PPP: 80.9 SECONDS (ref 25.4–36.1)
AST SERPL-CCNC: 41 U/L (ref 15–37)
BASOPHILS # BLD AUTO: 0.01 X10(3) UL (ref 0–0.2)
BASOPHILS NFR BLD AUTO: 0.1 %
BILIRUB SERPL-MCNC: 0.3 MG/DL (ref 0.1–2)
BUN BLD-MCNC: 41 MG/DL (ref 7–18)
CALCIUM BLD-MCNC: 8.1 MG/DL (ref 8.5–10.1)
CHLORIDE SERPL-SCNC: 110 MMOL/L (ref 98–112)
CO2 SERPL-SCNC: 15 MMOL/L (ref 21–32)
CREAT BLD-MCNC: 2.21 MG/DL
DEPRECATED HBV CORE AB SER IA-ACNC: 1294.5 NG/ML
EOSINOPHIL # BLD AUTO: 0.01 X10(3) UL (ref 0–0.7)
EOSINOPHIL NFR BLD AUTO: 0.1 %
ERYTHROCYTE [DISTWIDTH] IN BLOOD BY AUTOMATED COUNT: 12 %
GLOBULIN PLAS-MCNC: 4.2 G/DL (ref 2.8–4.4)
GLUCOSE BLD-MCNC: 105 MG/DL (ref 70–99)
HCT VFR BLD AUTO: 37.3 %
HGB BLD-MCNC: 12.5 G/DL
IMM GRANULOCYTES # BLD AUTO: 0.04 X10(3) UL (ref 0–1)
IMM GRANULOCYTES NFR BLD: 0.4 %
IRON SATURATION: 7 %
IRON SERPL-MCNC: 15 UG/DL
LYMPHOCYTES # BLD AUTO: 0.6 X10(3) UL (ref 1–4)
LYMPHOCYTES NFR BLD AUTO: 6.3 %
M PROTEIN MFR SERPL ELPH: 6.7 G/DL (ref 6.4–8.2)
MCH RBC QN AUTO: 32.3 PG (ref 26–34)
MCHC RBC AUTO-ENTMCNC: 33.5 G/DL (ref 31–37)
MCV RBC AUTO: 96.4 FL
MONOCYTES # BLD AUTO: 0.51 X10(3) UL (ref 0.1–1)
MONOCYTES NFR BLD AUTO: 5.4 %
NEUTROPHILS # BLD AUTO: 8.29 X10 (3) UL (ref 1.5–7.7)
NEUTROPHILS # BLD AUTO: 8.29 X10(3) UL (ref 1.5–7.7)
NEUTROPHILS NFR BLD AUTO: 87.7 %
OSMOLALITY SERPL CALC.SUM OF ELEC: 296 MOSM/KG (ref 275–295)
PLATELET # BLD AUTO: 244 10(3)UL (ref 150–450)
POTASSIUM SERPL-SCNC: 3.4 MMOL/L (ref 3.5–5.1)
PROCALCITONIN SERPL-MCNC: 0.66 NG/ML (ref ?–0.16)
PTH-INTACT SERPL-MCNC: 58.9 PG/ML (ref 18.5–88)
RBC # BLD AUTO: 3.87 X10(6)UL
SODIUM SERPL-SCNC: 138 MMOL/L (ref 136–145)
TOTAL IRON BINDING CAPACITY: 213 UG/DL (ref 240–450)
TRANSFERRIN SERPL-MCNC: 143 MG/DL (ref 200–360)
WBC # BLD AUTO: 9.5 X10(3) UL (ref 4–11)

## 2021-09-06 PROCEDURE — 83550 IRON BINDING TEST: CPT | Performed by: INTERNAL MEDICINE

## 2021-09-06 PROCEDURE — 85025 COMPLETE CBC W/AUTO DIFF WBC: CPT | Performed by: HOSPITALIST

## 2021-09-06 PROCEDURE — 97530 THERAPEUTIC ACTIVITIES: CPT

## 2021-09-06 PROCEDURE — 97161 PT EVAL LOW COMPLEX 20 MIN: CPT

## 2021-09-06 PROCEDURE — 83540 ASSAY OF IRON: CPT | Performed by: INTERNAL MEDICINE

## 2021-09-06 PROCEDURE — 82728 ASSAY OF FERRITIN: CPT | Performed by: INTERNAL MEDICINE

## 2021-09-06 PROCEDURE — 3E0333Z INTRODUCTION OF ANTI-INFLAMMATORY INTO PERIPHERAL VEIN, PERCUTANEOUS APPROACH: ICD-10-PCS | Performed by: HOSPITALIST

## 2021-09-06 PROCEDURE — 97116 GAIT TRAINING THERAPY: CPT

## 2021-09-06 PROCEDURE — 85730 THROMBOPLASTIN TIME PARTIAL: CPT | Performed by: EMERGENCY MEDICINE

## 2021-09-06 PROCEDURE — 80053 COMPREHEN METABOLIC PANEL: CPT | Performed by: EMERGENCY MEDICINE

## 2021-09-06 PROCEDURE — 83970 ASSAY OF PARATHORMONE: CPT | Performed by: INTERNAL MEDICINE

## 2021-09-06 PROCEDURE — 85730 THROMBOPLASTIN TIME PARTIAL: CPT | Performed by: HOSPITALIST

## 2021-09-06 RX ORDER — LATANOPROST 50 UG/ML
1 SOLUTION/ DROPS OPHTHALMIC NIGHTLY
Status: DISCONTINUED | OUTPATIENT
Start: 2021-09-06 | End: 2021-09-07

## 2021-09-06 RX ORDER — BENZONATATE 200 MG/1
200 CAPSULE ORAL 3 TIMES DAILY PRN
Status: DISCONTINUED | OUTPATIENT
Start: 2021-09-06 | End: 2021-09-07

## 2021-09-06 RX ORDER — GUAIFENESIN 600 MG
600 TABLET, EXTENDED RELEASE 12 HR ORAL 2 TIMES DAILY
Status: DISCONTINUED | OUTPATIENT
Start: 2021-09-06 | End: 2021-09-07

## 2021-09-06 RX ORDER — METOPROLOL SUCCINATE 25 MG/1
25 TABLET, EXTENDED RELEASE ORAL
Status: DISCONTINUED | OUTPATIENT
Start: 2021-09-06 | End: 2021-09-07

## 2021-09-06 RX ORDER — PANTOPRAZOLE SODIUM 20 MG/1
20 TABLET, DELAYED RELEASE ORAL
Status: DISCONTINUED | OUTPATIENT
Start: 2021-09-06 | End: 2021-09-07

## 2021-09-06 RX ORDER — ACETAMINOPHEN 325 MG/1
650 TABLET ORAL EVERY 6 HOURS PRN
Status: DISCONTINUED | OUTPATIENT
Start: 2021-09-06 | End: 2021-09-07

## 2021-09-06 RX ORDER — METOPROLOL SUCCINATE 50 MG/1
50 TABLET, EXTENDED RELEASE ORAL NIGHTLY
Status: DISCONTINUED | OUTPATIENT
Start: 2021-09-06 | End: 2021-09-06

## 2021-09-06 RX ORDER — ASPIRIN 81 MG/1
81 TABLET ORAL DAILY
Status: DISCONTINUED | OUTPATIENT
Start: 2021-09-06 | End: 2021-09-07

## 2021-09-06 RX ORDER — SODIUM CHLORIDE 9 MG/ML
INJECTION, SOLUTION INTRAVENOUS CONTINUOUS
Status: ACTIVE | OUTPATIENT
Start: 2021-09-06 | End: 2021-09-06

## 2021-09-06 RX ORDER — DEXAMETHASONE SODIUM PHOSPHATE 4 MG/ML
6 VIAL (ML) INJECTION ONCE
Status: COMPLETED | OUTPATIENT
Start: 2021-09-06 | End: 2021-09-06

## 2021-09-06 RX ORDER — HEPARIN SODIUM AND DEXTROSE 10000; 5 [USP'U]/100ML; G/100ML
INJECTION INTRAVENOUS CONTINUOUS
Status: DISCONTINUED | OUTPATIENT
Start: 2021-09-06 | End: 2021-09-07

## 2021-09-06 RX ORDER — METOCLOPRAMIDE HYDROCHLORIDE 5 MG/ML
5 INJECTION INTRAMUSCULAR; INTRAVENOUS EVERY 8 HOURS PRN
Status: DISCONTINUED | OUTPATIENT
Start: 2021-09-06 | End: 2021-09-07

## 2021-09-06 RX ORDER — ONDANSETRON 2 MG/ML
4 INJECTION INTRAMUSCULAR; INTRAVENOUS EVERY 6 HOURS PRN
Status: DISCONTINUED | OUTPATIENT
Start: 2021-09-06 | End: 2021-09-07

## 2021-09-06 NOTE — PROGRESS NOTES
NURSING ADMISSION NOTE      Patient admitted via Cart  Oriented to room. Safety precautions initiated. Bed in low position. Call light in reach.       Pt A&Ox4 15LHF  Resting in bed  Meds given per Mar  Denies pain, but C/O of feeling SOB(O2 94%)  Urina

## 2021-09-06 NOTE — CONSULTS
[facility] Cardiology Consultation NoteConyo Stevenson MD    The patient was interviewed, examined, the chart was reviewed and the consult was dictated. This is a 79year old male with a chief complaint of weakness.   We are asked see the patient due to rapid heartbe

## 2021-09-06 NOTE — PLAN OF CARE
Received alert and oriented  No complaints of pain  Lungs diminished bilaterally  Up to chair with standby assist  Heparin and Cardizem per [facility]  All needs met  Son updated via phone  Will monitor.

## 2021-09-06 NOTE — CONSULTS
[address]    PATIENT'S NAME: Mulugeta Aranda. ATTENDING PHYSICIAN: Jareth Estrada MD   CONSULTING PHYSICIAN: Deana Palm M.D.    PATIENT ACCOUNT#:   [de-identified]    LOCATION:  [address] [place] [address]  MEDICAL RECORD #:   [id number]       DATE OF BIRTH:  1 atrial flutter. PLAN:  Rate-slowing drugs, anticoagulation, treat COVID, echocardiogram, further orders to follow.     Dictated By Nicki Severin, M.D.  d: 09/06/2021 12:27:49  t: 09/06/2021 13:46:05  [facility] [id number]  MRO/

## 2021-09-06 NOTE — CONSULTS
Pulmonary Consult     Assessment / Plan:  1. COVID-19 pneumonia  - Vaccine series Arley Atkinson) completed in April  - No hypoxia, now on room air  - Hold on steroids, remdesivir, antibiotics at this time  2.  COPD  - No exacerbation, no steroids  - BD protocol Past Week at Unknown time  CALCIUM OR, Take 1 tablet by mouth daily. , Disp: , Rfl: , Past Week at Unknown time  Cholecalciferol (VITAMIN D3 OR), Take 1 tablet by mouth daily.   , Disp: , Rfl: , Past Week at Unknown time  Multiple Vitamins-Minerals (Iris Keys History   Problem Relation Age of Onset   • Heart Disorder Father    • Hypertension Father    • Heart Disorder Paternal Grandfather    • Hypertension Paternal Grandfather          Exam:   09/06/21  0718 09/06/21  0800 09/06/21  0815 09/06/21  1203   BP:  1

## 2021-09-06 NOTE — CONSULTS
INFECTIOUS DISEASE CONSULTATION    Zaida Clark.  Patient Status:  Inpatient    1940 MRN [id number]   [facility] 5NW-A Attending Mallory Luevano MD   [address] Day # 0 PCP Jyoti Ladd, to iodine  Lidocaine               RASH    Medications:    Current Facility-Administered Medications:   •  heparin (PORCINE) drip 88048ghffc/250mL infusion CONTINUOUS, 200-3,000 Units/hr, Intravenous, Continuous  •  aspirin EC tab 81 mg, 81 mg, Oral, Daily Vitamins-Minerals (MULTIVITAMIN ADULTS OR), Take 1 tablet by mouth daily. , Disp: , Rfl:   Ascorbic Acid (VITAMIN C OR), Take 1 tablet by mouth daily.   , Disp: , Rfl:   omeprazole 20 MG Oral Capsule Delayed Release, Take 20 mg by mouth every evening.  , D hours.    Inflammatory Markers  No results for input(s): CRP, SEB, LDH, DDIMER in the last 168 hours. Microbiologic Data:   No results found for this visit on 09/05/21.       Imaging:  CXR noted  Established Problem list:  Patient Active Problem List:

## 2021-09-06 NOTE — CONSULTS
[facility]    Report of Consultation    Dutch Luciano.  Patient Status:  Inpatient    1940 MRN [id number]   [place] 5NW-A Attending Raza Burger MD   [facility] Day # 0 PCP Juliette Conti MD     Date of Admission:  2021  Date quittin.7      Smokeless tobacco: Never Used    Vaping Use      Vaping Use: Never used    Alcohol use: Yes    Drug use: Never          Current Medications:  heparin (PORCINE) drip 14668jtqib/250mL infusion CONTINUOUS, 200-3,000 Units/hr, Intravenous, denies allergy to iodine  Lidocaine               RASH    Review of Systems:   Pertinent items are noted in HPI. Physical Exam:   Vital Signs:  Blood pressure 143/75, pulse 74, temperature 97.6 °F (36.4 °C), temperature source Oral, resp.  rate 20, heigh CKD:  - Target >10  - Transfuse for Hb <7  - No indication for epogen  - Will obtain iron studies. BMD:  - Will check PTH and vitamin D levels. HTN:  - On diltiazem and metoprolol as per cardiology.     Hypokalemia:  - KCL 20meq PO x 1    Thank you fo

## 2021-09-06 NOTE — PHYSICAL THERAPY NOTE
PHYSICAL THERAPY EVALUATION - INPATIENT     Room Number: 530/530-A  Evaluation Date: 9/6/2021  Type of Evaluation: Initial  Physician Order: PT Eval and Treat    Presenting Problem: diff breathing, COVID+ 9/5  Reason for Therapy: Mobility Dysfunction COGNITION  · Overall Cognitive Status:  WFL - within functional limits    RANGE OF MOTION AND STRENGTH ASSESSMENT  Upper extremity ROM and strength are within functional limits     Lower extremity ROM is within functional limits     Lower extremity str Exercise/Education Provided:  Discussed role of PT, goals for the session, POC. Patient was educated on safety during transfers/gait with the RW, educated on LE exs and encouraged patient to do exs as able during the day.   Encouraged patient to stay s assistance level: independent     Goal #3 Patient is able to ambulate 200 feet with assist device: none at assistance level: independent     Goal #4    Goal #5    Goal #6    Goal Comments: Goals established on 9/6/2021    PPE worn by PT: gloves, gown, gogg

## 2021-09-06 NOTE — H&P
[facility]    History and Physical     Balaji Tasha.  Patient Status:  Inpatient    1940 MRN [id number]   [facility] 5NW-A Attending Guilherme Whitehead MD   Hosp Day # 0 PCP Hal Jasso MD     Chief Complaint: SOB and Wea Date   • CATARACT     • COLONOSCOPY     • OTHER      radical prostatectomy   • UPPER GI ENDOSCOPY,EXAM     Pacemaker    Social History:  reports that he quit smoking about 36 years ago. His smoking use included cigarettes.  He started smoking about 62 years systems was completed. Pertinent positives and negatives noted in the HPI.     Physical Exam:    /76 (BP Location: Left arm)   Pulse 112   Temp (!) 100.7 °F (38.2 °C) (Oral)   Resp 18   Ht 5' 10\" (1.778 m)   Wt 142 lb 1.6 oz (64.5 kg)   SpO2 96% decadron x 1 now  -supp o2, titrate down as possible  -Prone as possible  -trend markers    Afib rvr with hx PAF  -likely brought on by covid 19 infection   -continue with po metoprolol  -diltiazem drip  -heparin drip   -cardiology consulted    PATRICA on CKD

## 2021-09-07 ENCOUNTER — APPOINTMENT (OUTPATIENT)
Dept: ULTRASOUND IMAGING | Facility: HOSPITAL | Age: 81
DRG: 177 | End: 2021-09-07
Attending: INTERNAL MEDICINE
Payer: MEDICARE

## 2021-09-07 ENCOUNTER — APPOINTMENT (OUTPATIENT)
Dept: CV DIAGNOSTICS | Facility: HOSPITAL | Age: 81
DRG: 177 | End: 2021-09-07
Attending: HOSPITALIST
Payer: MEDICARE

## 2021-09-07 VITALS
TEMPERATURE: 98 F | HEIGHT: 70 IN | OXYGEN SATURATION: 95 % | HEART RATE: 73 BPM | BODY MASS INDEX: 20.86 KG/M2 | SYSTOLIC BLOOD PRESSURE: 145 MMHG | RESPIRATION RATE: 18 BRPM | WEIGHT: 145.69 LBS | DIASTOLIC BLOOD PRESSURE: 66 MMHG

## 2021-09-07 LAB
ALBUMIN SERPL-MCNC: 2.3 G/DL (ref 3.4–5)
ALBUMIN/GLOB SERPL: 0.5 {RATIO} (ref 1–2)
ALP LIVER SERPL-CCNC: 49 U/L
ALT SERPL-CCNC: 23 U/L
ANION GAP SERPL CALC-SCNC: 12 MMOL/L (ref 0–18)
APTT PPP: 100 SECONDS (ref 25.4–36.1)
AST SERPL-CCNC: 42 U/L (ref 15–37)
ATRIAL RATE: 150 BPM
ATRIAL RATE: 45 BPM
BASOPHILS # BLD AUTO: 0.01 X10(3) UL (ref 0–0.2)
BASOPHILS NFR BLD AUTO: 0.2 %
BILIRUB SERPL-MCNC: 0.3 MG/DL (ref 0.1–2)
BILIRUB UR QL STRIP.AUTO: NEGATIVE
BUN BLD-MCNC: 53 MG/DL (ref 7–18)
CALCIUM BLD-MCNC: 8.9 MG/DL (ref 8.5–10.1)
CHLORIDE SERPL-SCNC: 111 MMOL/L (ref 98–112)
CLARITY UR REFRACT.AUTO: CLEAR
CO2 SERPL-SCNC: 16 MMOL/L (ref 21–32)
COLOR UR AUTO: YELLOW
CREAT BLD-MCNC: 1.87 MG/DL
CREAT UR-SCNC: 64.6 MG/DL
CRP SERPL-MCNC: 15.4 MG/DL (ref ?–0.3)
D-DIMER: 1.99 UG/ML FEU (ref ?–0.82)
DEPRECATED HBV CORE AB SER IA-ACNC: 1538 NG/ML
EOSINOPHIL # BLD AUTO: 0 X10(3) UL (ref 0–0.7)
EOSINOPHIL NFR BLD AUTO: 0 %
ERYTHROCYTE [DISTWIDTH] IN BLOOD BY AUTOMATED COUNT: 12.1 %
GLOBULIN PLAS-MCNC: 4.4 G/DL (ref 2.8–4.4)
GLUCOSE BLD-MCNC: 123 MG/DL (ref 70–99)
GLUCOSE UR STRIP.AUTO-MCNC: NEGATIVE MG/DL
HAV IGM SER QL: 2.1 MG/DL (ref 1.6–2.6)
HCT VFR BLD AUTO: 35.4 %
HGB BLD-MCNC: 12.3 G/DL
IMM GRANULOCYTES # BLD AUTO: 0.03 X10(3) UL (ref 0–1)
IMM GRANULOCYTES NFR BLD: 0.5 %
KETONES UR STRIP.AUTO-MCNC: NEGATIVE MG/DL
LDH SERPL L TO P-CCNC: 240 U/L
LEUKOCYTE ESTERASE UR QL STRIP.AUTO: NEGATIVE
LYMPHOCYTES # BLD AUTO: 0.9 X10(3) UL (ref 1–4)
LYMPHOCYTES NFR BLD AUTO: 13.9 %
M PROTEIN MFR SERPL ELPH: 6.7 G/DL (ref 6.4–8.2)
MCH RBC QN AUTO: 33.3 PG (ref 26–34)
MCHC RBC AUTO-ENTMCNC: 34.7 G/DL (ref 31–37)
MCV RBC AUTO: 95.9 FL
MONOCYTES # BLD AUTO: 0.54 X10(3) UL (ref 0.1–1)
MONOCYTES NFR BLD AUTO: 8.3 %
NEUTROPHILS # BLD AUTO: 5 X10 (3) UL (ref 1.5–7.7)
NEUTROPHILS # BLD AUTO: 5 X10(3) UL (ref 1.5–7.7)
NEUTROPHILS NFR BLD AUTO: 77.1 %
NITRITE UR QL STRIP.AUTO: NEGATIVE
OSMOLALITY SERPL CALC.SUM OF ELEC: 304 MOSM/KG (ref 275–295)
PH UR STRIP.AUTO: 5 [PH] (ref 5–8)
PHOSPHATE SERPL-MCNC: 4.2 MG/DL (ref 2.5–4.9)
PLATELET # BLD AUTO: 265 10(3)UL (ref 150–450)
POTASSIUM SERPL-SCNC: 3.7 MMOL/L (ref 3.5–5.1)
PROT UR STRIP.AUTO-MCNC: 100 MG/DL
PROT UR-MCNC: 92.2 MG/DL
Q-T INTERVAL: 332 MS
Q-T INTERVAL: 340 MS
QRS DURATION: 106 MS
QRS DURATION: 118 MS
QTC CALCULATION (BEZET): 542 MS
QTC CALCULATION (BEZET): 543 MS
R AXIS: 102 DEGREES
R AXIS: 186 DEGREES
RBC # BLD AUTO: 3.69 X10(6)UL
SODIUM SERPL-SCNC: 139 MMOL/L (ref 136–145)
SODIUM SERPL-SCNC: 41 MMOL/L
SP GR UR STRIP.AUTO: 1.01 (ref 1–1.03)
T AXIS: 42 DEGREES
T AXIS: 62 DEGREES
UROBILINOGEN UR STRIP.AUTO-MCNC: <2 MG/DL
VENTRICULAR RATE: 153 BPM
VENTRICULAR RATE: 161 BPM
WBC # BLD AUTO: 6.5 X10(3) UL (ref 4–11)

## 2021-09-07 PROCEDURE — 84300 ASSAY OF URINE SODIUM: CPT | Performed by: INTERNAL MEDICINE

## 2021-09-07 PROCEDURE — 83615 LACTATE (LD) (LDH) ENZYME: CPT | Performed by: HOSPITALIST

## 2021-09-07 PROCEDURE — 93306 TTE W/DOPPLER COMPLETE: CPT | Performed by: HOSPITALIST

## 2021-09-07 PROCEDURE — 82728 ASSAY OF FERRITIN: CPT | Performed by: HOSPITALIST

## 2021-09-07 PROCEDURE — 83735 ASSAY OF MAGNESIUM: CPT | Performed by: INTERNAL MEDICINE

## 2021-09-07 PROCEDURE — 86140 C-REACTIVE PROTEIN: CPT | Performed by: HOSPITALIST

## 2021-09-07 PROCEDURE — 82570 ASSAY OF URINE CREATININE: CPT | Performed by: INTERNAL MEDICINE

## 2021-09-07 PROCEDURE — 81001 URINALYSIS AUTO W/SCOPE: CPT | Performed by: INTERNAL MEDICINE

## 2021-09-07 PROCEDURE — 84100 ASSAY OF PHOSPHORUS: CPT | Performed by: INTERNAL MEDICINE

## 2021-09-07 PROCEDURE — 84156 ASSAY OF PROTEIN URINE: CPT | Performed by: INTERNAL MEDICINE

## 2021-09-07 PROCEDURE — 97165 OT EVAL LOW COMPLEX 30 MIN: CPT

## 2021-09-07 PROCEDURE — 85025 COMPLETE CBC W/AUTO DIFF WBC: CPT | Performed by: HOSPITALIST

## 2021-09-07 PROCEDURE — 76770 US EXAM ABDO BACK WALL COMP: CPT | Performed by: INTERNAL MEDICINE

## 2021-09-07 PROCEDURE — 85379 FIBRIN DEGRADATION QUANT: CPT | Performed by: HOSPITALIST

## 2021-09-07 PROCEDURE — 80053 COMPREHEN METABOLIC PANEL: CPT | Performed by: EMERGENCY MEDICINE

## 2021-09-07 PROCEDURE — 85730 THROMBOPLASTIN TIME PARTIAL: CPT | Performed by: HOSPITALIST

## 2021-09-07 RX ORDER — POTASSIUM CHLORIDE 20 MEQ/1
40 TABLET, EXTENDED RELEASE ORAL ONCE
Status: COMPLETED | OUTPATIENT
Start: 2021-09-07 | End: 2021-09-07

## 2021-09-07 RX ORDER — AMLODIPINE BESYLATE 10 MG/1
5 TABLET ORAL DAILY
Qty: 30 TABLET | Refills: 5 | Status: SHIPPED | OUTPATIENT
Start: 2021-09-07

## 2021-09-07 RX ORDER — MELATONIN
325
Qty: 30 TABLET | Refills: 0 | Status: SHIPPED | OUTPATIENT
Start: 2021-09-07

## 2021-09-07 RX ORDER — METOPROLOL SUCCINATE 25 MG/1
25 TABLET, EXTENDED RELEASE ORAL
Qty: 60 TABLET | Refills: 5 | Status: SHIPPED | OUTPATIENT
Start: 2021-09-07

## 2021-09-07 RX ORDER — BENZONATATE 200 MG/1
200 CAPSULE ORAL 3 TIMES DAILY PRN
Qty: 20 CAPSULE | Refills: 0 | Status: SHIPPED | OUTPATIENT
Start: 2021-09-07 | End: 2021-10-06 | Stop reason: ALTCHOICE

## 2021-09-07 NOTE — CM/SW NOTE
09/07/21 1500   Discharge disposition   Expected discharge disposition Home-Health   Post Acute Care Provider Residential

## 2021-09-07 NOTE — PROGRESS NOTES
[address] Patient Status:  Inpatient    1940 MRN [id number]   [facility] 5NW-A Attending Lakeisha Mercado MD   Hosp Day # 1 PCP Elen Mistry MD     SUBJECTIVE: Pt denies complaints.     OBJECTIVE:  /78 (B deformity.                         QYJUJ: ZPJDNMW rate and rhythm, normal S1S2                          Abdomen: soft, non-tender, non-distended, positive BS.                         Extremity: No clubbing or cyanosis.  no edema                          E IM  4. A.Fib:  -per cards  -follow up on echo   5. Proph  - LMWH  6.  Dispo  - stable to discharge home from [address] [place], [place] [address]fred Leblanc MD  9/7/2021  11:26 AM

## 2021-09-07 NOTE — PROGRESS NOTES
[facility]                INFECTIOUS DISEASE PROGRESS NOTE    Irvin Granados.  Patient Status:  Inpatient    1940 MRN [id number]   [facility] 5NW-A Attending Dorina Puri MD   [facility] Day # 1 PCP MD Adeel Ya found for: [facility] Encounter on 09/05/21   1.  Blood Culture     Status: None (Preliminary result)    Collection Time: 09/05/21  6:14 PM    Specimen: Blood,peripheral   Result Value Ref Range    Blood Culture Result No Growth 1 Day N

## 2021-09-07 NOTE — PROGRESS NOTES
[facility] Cardiology Progress Note - Rachelron Gemini.  Patient Status:  Inpatient    1940 MRN [id number]   [facility] 5NW-A Attending Fariha Riggins MD   [facility] Day # 1 PCP Danelle Macias MD     Subjective:  Patient Clear without wheezes, rales, rhonchi or dullness. Normal excursions and effort. Abdomen: Soft, non-tender. No organosplenomegally, mass or rebound, BS-present. Extremities: Without clubbing, cyanosis or edema. Peripheral pulses are 2+. Neurologic:  Al Oral, TID PRN  guaiFENesin ER (MUCINEX) 12 hr tab 600 mg, 600 mg, Oral, BID  metoprolol succinate (Toprol XL) 24 hr tab 25 mg, 25 mg, Oral, 2x Daily(Beta Blocker)  dilTIAZem 100mg/100ml in NaCl (cardIZEM) 1 mg/mL IVPB add-vantage, 5 mg/hr, Intravenous, Con

## 2021-09-07 NOTE — HOME CARE LIAISON
Patient provided with list of Lupis Rodriguez providers from [facility], patient choice is [address]. Agency reserved in [facility] and contact information placed on AVS. Financial interest disclosure provided to patient. ABDULKADIR Kraus updated.

## 2021-09-07 NOTE — CM/SW NOTE
09/07/21 1210   CM/SW Referral Data   Referral Source    Reason for Referral Discharge planning   [employer] EMR  (nursing rounds)   Pertinent Medical Hx   Does patient have an established PCP?  Yes   Discharge Needs   Anticipated D/C needs Ho

## 2021-09-07 NOTE — PLAN OF CARE
Pt discharged home via wheelchair. [facility] to follow up. Discahrge instructions given with pt verbalizing understanding. Son updated over the phone.

## 2021-09-07 NOTE — COVID NURSING ASSESSMENT
COVID-19 Daily Discharge Readiness-Nursing    O2 Sat at Rest:    96 % RA  O2 Sat with Exertion:    % on    liters   Temperature max from last 24 hrs: Temp (24hrs), Av.1 °F (36.7 °C), Min:97.6 °F (36.4 °C), Max:98.5 °F (36.9 °C)    Inflammatory Markers:

## 2021-09-07 NOTE — OCCUPATIONAL THERAPY NOTE
OCCUPATIONAL THERAPY QUICK EVALUATION - INPATIENT    Room Number: 530/530-A  Evaluation Date: 9/7/2021     Type of Evaluation: Quick Eval  Presenting Problem: COVID-19    Physician Order: IP Consult to Occupational Therapy  Reason for Therapy:  ADL/IADL Dy Risk: Standard fall risk    WEIGHT BEARING RESTRICTION  Weight Bearing Restriction: None                PAIN ASSESSMENT  Ratin  Location: denies       COGNITION  WFL    RANGE OF MOTION AND STRENGTH ASSESSMENT  Upper extremity ROM is within functional l can rate your SOB from 0-10  - Goal is to stay below 7/10 with activity  - If you reach beyond 7/10, it is important to rest; stop activity and if you need to sit down to recover    Patient's RPE on the TIANNA Dyspnea Scale is: 0 w/ activity.     Received on admission.     Patient was able to achieve the following goals:  Patient able to toilet transfer: safely and independently  Patient able to dress lower extremities: safely and independently  Patient/Caregiver able to demonstrate safety with ADLS: safely and

## 2021-09-07 NOTE — PROGRESS NOTES
[facility]    Nephrology Progress Note    Ryan Guardado. Attending:  Samy Wilcox MD     Cc: primo    SUBJECTIVE     No complaints . No diarrhea.      PHYSICAL EXAM   Vital signs: /78 (BP Location: Left arm)   Pulse 66   Temp 97.6 °F (36.4 °C) 09/07/2021    .0 09/07/2021    CREATSERUM 1.87 09/07/2021    BUN 53 09/07/2021     09/07/2021    K 3.7 09/07/2021     09/07/2021    CO2 16.0 09/07/2021     09/07/2021    CA 8.9 09/07/2021    ALB 2.3 09/07/2021    ALKPHO 49 09/07/2 shortness of breath, fatigue, cough, diarrhea,     frequent falls, pacemaker placed 2 months ago         PATIENT STATED HISTORY: (As transcribed by Technologist)  Patient offered     no additional history at this time.                 FINDINGS:           Em

## 2021-09-09 ENCOUNTER — PATIENT OUTREACH (OUTPATIENT)
Dept: CASE MANAGEMENT | Age: 81
End: 2021-09-09

## 2021-09-09 DIAGNOSIS — Z02.9 ENCOUNTERS FOR UNSPECIFIED ADMINISTRATIVE PURPOSE: ICD-10-CM

## 2021-09-09 LAB — 1,25-DIHYDROXYVITAMIN D: 100 PG/ML

## 2021-09-09 NOTE — PROGRESS NOTES
1st attempt to contact Pt to help schedule the following AVS HFU Appts:  Returning VM from Pepe Kasper, [phone]    Schedule an appointment with Bill Mera MD as soon  as possible for a visit  Cardiology follow up  [address] [facility]  [address] [facility]

## 2021-09-09 NOTE — PROGRESS NOTES
Initial Post Discharge Follow Up   Discharge Date: 9/7/21  Contact Date: 9/9/2021    Consent Verification:  Assessment Completed With: Patient  HIPAA Verified? Yes    Discharge Dx:     COVID-19 pneumonia  - s/p nathaly in the ED  COPD  Falls  A. Fib mouth every evening.          • Did you  your discharge medications when you left the hospital? Yes  • May I go over your medications with you to make sure we are not missing anything?no; NCM did confirm the patient has gotten all newly prescribed me [address]le Mc[address] Dr, Cammie)    For the safety of our patients, visitors and care teams, all patients and visitors are required to wear a mask during their entire visit, only to be removed if asked to do so by your provider.   We are working to limit clinically required.             [address], [address], Cammie  100 100 [facility] [address] [place] [address] [phone] Neurology - [facility] [address], Nayana

## 2021-09-10 ENCOUNTER — TELEMEDICINE (OUTPATIENT)
Dept: INTERNAL MEDICINE CLINIC | Facility: CLINIC | Age: 81
End: 2021-09-10

## 2021-09-10 DIAGNOSIS — N28.1 RENAL CYST: ICD-10-CM

## 2021-09-10 DIAGNOSIS — Z95.0 PACEMAKER: ICD-10-CM

## 2021-09-10 DIAGNOSIS — I48.91 ATRIAL FIBRILLATION, UNSPECIFIED TYPE (HCC): ICD-10-CM

## 2021-09-10 DIAGNOSIS — R93.0 ABNORMAL CT SCAN, HEAD: ICD-10-CM
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
## 2021-09-10 DIAGNOSIS — U07.1 COVID-19: Primary | ICD-10-CM

## 2021-09-10 DIAGNOSIS — N18.32 STAGE 3B CHRONIC KIDNEY DISEASE (HCC): ICD-10-CM

## 2021-09-10 DIAGNOSIS — J44.9 CHRONIC OBSTRUCTIVE PULMONARY DISEASE, UNSPECIFIED COPD TYPE (HCC): ICD-10-CM
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
## 2021-09-10 PROCEDURE — 99495 TRANSJ CARE MGMT MOD F2F 14D: CPT | Performed by: PHYSICIAN ASSISTANT

## 2021-09-10 PROCEDURE — 1111F DSCHRG MED/CURRENT MED MERGE: CPT | Performed by: PHYSICIAN ASSISTANT

## 2021-09-10 RX ORDER — GUAIFENESIN AND DEXTROMETHORPHAN HYDROBROMIDE 100; 10 MG/5ML; MG/5ML
5 SOLUTION ORAL NIGHTLY PRN
COMMUNITY
End: 2021-10-06 | Stop reason: ALTCHOICE

## 2021-09-10 RX ORDER — GUAIFENESIN 600 MG
600 TABLET, EXTENDED RELEASE 12 HR ORAL DAILY
COMMUNITY
End: 2021-10-06 | Stop reason: ALTCHOICE

## 2021-09-10 RX ORDER — ALBUTEROL SULFATE 90 UG/1
2 AEROSOL, METERED RESPIRATORY (INHALATION) EVERY 6 HOURS PRN
COMMUNITY

## 2021-09-10 NOTE — PROGRESS NOTES
Video Visit  [address]      Please note that the following visit was completed using two-way, real-time interactive audio and video communication.   This has been done in good rashad to provide continuity of care in the best deconditioning was present prior to having COVID. He could barely walk a block prior to this hospitalization. Son mentions they are scheduled to follow up neurology soon to look into deconditioning further.  He had echo during hospitalization w/ EF 60-65% OR), Take 1 tablet by mouth daily. , Disp: , Rfl:   omeprazole 20 MG Oral Capsule Delayed Release, Take 20 mg by mouth every evening.  , Disp: , Rfl:     No current facility-administered medications for this visit.       HISTORY: reconciled and reviewed wi Date/Time    WBC 6.5 09/07/2021 07:42 AM    HGB 12.3 (L) 09/07/2021 07:42 AM    HCT 35.4 (L) 09/07/2021 07:42 AM    .0 09/07/2021 07:42 AM     (H) 09/07/2021 07:41 AM     09/07/2021 07:41 AM    K 3.7 09/07/2021 07:41 AM     09/07/ discharge. Pt still dyspneic and family plans to purchase pulse O2 due to his underlying COPD. He is on eliquis for PAF and doubt PE.   Recent echo reviewed and EF 60%  Ongoing harsh cough - will continue antitussives  No fever/chills  Ensure/magic cup to Oral Tab, Take 0.5 tablets (5 mg total) by mouth daily. , Disp: 30 tablet, Rfl: 5  metoprolol succinate 25 MG Oral Tablet 24 Hr, Take 1 tablet (25 mg total) by mouth 2x Daily(Beta Blocker). , Disp: 60 tablet, Rfl: 5  apixaban (ELIQUIS) 2.5 MG Oral Tab, Take tests and need for follow-up on treatments  ? specialists already aware of assumption/resumption of care  ? Education given to patient and family on self-management, independent living, and activities of daily living. ?  Assisted in scheduling required fol responsibility for follow up with this patient.     Shelby Alvares, 9/10/2021  Ree Endo  [phone]

## 2021-09-10 NOTE — PROGRESS NOTES
Pt informed. States he is having aches and pains. I told him it may be due to the Vitamin D. He states understanding. Is in quarantine due to +COVID until next week. Will complete labs then. Orders placed. Scheduled for U 10/06.      Future Appointments

## 2021-09-10 NOTE — PROGRESS NOTES
TRANSITIONAL CARE CLINIC PHARMACIST MEDICATION RECONCILIATION        Catalina Blount.  MRN [id number]    1940 PCP Taj Crouch MD       Comments: Medication history completed by the [facility] Pharmacist with the patient, using two-wa ADULTS OR) Take 1 tablet by mouth daily. • Ascorbic Acid (VITAMIN C OR) Take 1 tablet by mouth daily. • omeprazole 20 MG Oral Capsule Delayed Release Take 20 mg by mouth every evening.      • guaiFENesin  MG Oral Tablet 12 Hr Take 600 mg by mo

## 2021-09-15 ENCOUNTER — APPOINTMENT (OUTPATIENT)
Dept: ULTRASOUND IMAGING | Facility: HOSPITAL | Age: 81
End: 2021-09-15
Attending: EMERGENCY MEDICINE
Payer: MEDICARE

## 2021-09-15 ENCOUNTER — APPOINTMENT (OUTPATIENT)
Dept: GENERAL RADIOLOGY | Facility: HOSPITAL | Age: 81
End: 2021-09-15
Attending: EMERGENCY MEDICINE
Payer: MEDICARE

## 2021-09-15 ENCOUNTER — APPOINTMENT (OUTPATIENT)
Dept: NUCLEAR MEDICINE | Facility: HOSPITAL | Age: 81
End: 2021-09-15
Attending: EMERGENCY MEDICINE
Payer: MEDICARE

## 2021-09-15 ENCOUNTER — HOSPITAL ENCOUNTER (EMERGENCY)
Facility: HOSPITAL | Age: 81
Discharge: HOME | End: 2021-09-15
Attending: EMERGENCY MEDICINE
Payer: MEDICARE

## 2021-09-15 VITALS
OXYGEN SATURATION: 96 % | SYSTOLIC BLOOD PRESSURE: 107 MMHG | WEIGHT: 137 LBS | RESPIRATION RATE: 24 BRPM | DIASTOLIC BLOOD PRESSURE: 61 MMHG | HEART RATE: 79 BPM | BODY MASS INDEX: 19.61 KG/M2 | TEMPERATURE: 98 F | HEIGHT: 70 IN

## 2021-09-15 DIAGNOSIS — J12.82 PNEUMONIA DUE TO COVID-19 VIRUS: Primary | ICD-10-CM

## 2021-09-15 DIAGNOSIS — U07.1 PNEUMONIA DUE TO COVID-19 VIRUS: Primary | ICD-10-CM

## 2021-09-15 LAB
ALBUMIN SERPL-MCNC: 2.5 G/DL (ref 3.4–5)
ALBUMIN/GLOB SERPL: 0.5 {RATIO} (ref 1–2)
ALP LIVER SERPL-CCNC: 105 U/L
ALT SERPL-CCNC: 30 U/L
ANION GAP SERPL CALC-SCNC: 10 MMOL/L (ref 0–18)
AST SERPL-CCNC: 20 U/L (ref 15–37)
BASOPHILS # BLD AUTO: 0.03 X10(3) UL (ref 0–0.2)
BASOPHILS NFR BLD AUTO: 0.3 %
BILIRUB SERPL-MCNC: 0.6 MG/DL (ref 0.1–2)
BUN BLD-MCNC: 34 MG/DL (ref 7–18)
CALCIUM BLD-MCNC: 9.5 MG/DL (ref 8.5–10.1)
CHLORIDE SERPL-SCNC: 101 MMOL/L (ref 98–112)
CO2 SERPL-SCNC: 22 MMOL/L (ref 21–32)
CREAT BLD-MCNC: 1.92 MG/DL
D-DIMER: 2.64 UG/ML FEU (ref ?–0.82)
EOSINOPHIL # BLD AUTO: 0.09 X10(3) UL (ref 0–0.7)
EOSINOPHIL NFR BLD AUTO: 0.8 %
ERYTHROCYTE [DISTWIDTH] IN BLOOD BY AUTOMATED COUNT: 12 %
GLOBULIN PLAS-MCNC: 5.3 G/DL (ref 2.8–4.4)
GLUCOSE BLD-MCNC: 100 MG/DL (ref 70–99)
HCT VFR BLD AUTO: 35.7 %
HGB BLD-MCNC: 11.9 G/DL
IMM GRANULOCYTES # BLD AUTO: 0.08 X10(3) UL (ref 0–1)
IMM GRANULOCYTES NFR BLD: 0.7 %
LYMPHOCYTES # BLD AUTO: 1.24 X10(3) UL (ref 1–4)
LYMPHOCYTES NFR BLD AUTO: 11 %
MCH RBC QN AUTO: 33.1 PG (ref 26–34)
MCHC RBC AUTO-ENTMCNC: 33.3 G/DL (ref 31–37)
MCV RBC AUTO: 99.4 FL
MONOCYTES # BLD AUTO: 0.92 X10(3) UL (ref 0.1–1)
MONOCYTES NFR BLD AUTO: 8.2 %
NEUTROPHILS # BLD AUTO: 8.91 X10 (3) UL (ref 1.5–7.7)
NEUTROPHILS # BLD AUTO: 8.91 X10(3) UL (ref 1.5–7.7)
NEUTROPHILS NFR BLD AUTO: 79 %
OSMOLALITY SERPL CALC.SUM OF ELEC: 284 MOSM/KG (ref 275–295)
PLATELET # BLD AUTO: 383 10(3)UL (ref 150–450)
POTASSIUM SERPL-SCNC: 4.3 MMOL/L (ref 3.5–5.1)
PROT SERPL-MCNC: 7.8 G/DL (ref 6.4–8.2)
RBC # BLD AUTO: 3.59 X10(6)UL
SODIUM SERPL-SCNC: 133 MMOL/L (ref 136–145)
TROPONIN I SERPL-MCNC: <0.045 NG/ML (ref ?–0.04)
WBC # BLD AUTO: 11.3 X10(3) UL (ref 4–11)

## 2021-09-15 PROCEDURE — 93005 ELECTROCARDIOGRAM TRACING: CPT

## 2021-09-15 PROCEDURE — 85379 FIBRIN DEGRADATION QUANT: CPT | Performed by: EMERGENCY MEDICINE

## 2021-09-15 PROCEDURE — 85025 COMPLETE CBC W/AUTO DIFF WBC: CPT | Performed by: EMERGENCY MEDICINE

## 2021-09-15 PROCEDURE — 84484 ASSAY OF TROPONIN QUANT: CPT | Performed by: EMERGENCY MEDICINE

## 2021-09-15 PROCEDURE — 78580 LUNG PERFUSION IMAGING: CPT | Performed by: EMERGENCY MEDICINE

## 2021-09-15 PROCEDURE — 93970 EXTREMITY STUDY: CPT | Performed by: EMERGENCY MEDICINE

## 2021-09-15 PROCEDURE — 80053 COMPREHEN METABOLIC PANEL: CPT | Performed by: EMERGENCY MEDICINE

## 2021-09-15 PROCEDURE — 93010 ELECTROCARDIOGRAM REPORT: CPT

## 2021-09-15 PROCEDURE — 99285 EMERGENCY DEPT VISIT HI MDM: CPT

## 2021-09-15 PROCEDURE — 36415 COLL VENOUS BLD VENIPUNCTURE: CPT

## 2021-09-15 PROCEDURE — 71045 X-RAY EXAM CHEST 1 VIEW: CPT | Performed by: EMERGENCY MEDICINE

## 2021-09-15 RX ORDER — ALBUTEROL SULFATE 90 UG/1
2 AEROSOL, METERED RESPIRATORY (INHALATION) EVERY 4 HOURS PRN
Qty: 1 EACH | Refills: 0 | Status: SHIPPED | OUTPATIENT
Start: 2021-09-15 | End: 2021-10-06

## 2021-09-15 NOTE — ED INITIAL ASSESSMENT (HPI)
Pt was just released from hospital for covid. Pt wheezing, sob and chest pains at home. Pt with O2 of 80's. Pts VS returned to normal at office visit.

## 2021-09-15 NOTE — ED PROVIDER NOTES
Patient Seen in: [facility] Emergency Department      History   Patient presents with:  Abnormal Result    Stated Complaint: abn labs pos covid on 5th    Subjective: This is a 58-year-old male who arrives here with complaints of chest pain.   The without murmurs or rubs. ABD: The abdomen is soft nondistended nontender. There is no rebound. There is no guarding. EXT: There is good pulses bilaterally. There is no calf tenderness. There is no rash noted.   There is no edema    NEURO: Alert an interventricular conduction delay right bundle branch block. .  The rest of the EKG including rate rhythm axis and intervals I agree with the EKG report .  The rate is 74  Previous EKG report shows atrial fib per report        US VENOUS DOPPLER LEG BILAT - D uptake most consistent with lung disease, but without wedge-shaped or sizable perfusion defect, considered low probability for acute pulmonary embolism.    Dictated by (CST): Jaquan Grider MD on 9/15/2021 at 7:07 PM     Finalized by (CST): Jaquan Grider, at this present number chest x-ray does show slight progression of his Covid pneumonia. But he is not hypoxic and does not need to be admitted at this point he was given incentive spirometer, albuterol.   MDI teaching roni the things to return including pr

## 2021-09-17 LAB
ATRIAL RATE: 74 BPM
P AXIS: 92 DEGREES
P-R INTERVAL: 166 MS
Q-T INTERVAL: 414 MS
QRS DURATION: 130 MS
QTC CALCULATION (BEZET): 459 MS
R AXIS: -18 DEGREES
T AXIS: 32 DEGREES
VENTRICULAR RATE: 74 BPM

## 2021-09-20 PROBLEM — R29.6 FREQUENT FALLS: Status: ACTIVE | Noted: 2021-09-20

## 2021-09-20 PROBLEM — G43.109 OCULAR MIGRAINE: Status: ACTIVE | Noted: 2021-09-20

## 2021-09-20 PROBLEM — R26.89 BALANCE PROBLEM: Status: ACTIVE | Noted: 2021-09-20

## 2021-09-20 PROBLEM — G31.9 CEREBELLAR ATROPHY: Status: ACTIVE | Noted: 2021-09-20

## 2021-09-20 PROBLEM — G31.9 CEREBELLAR ATROPHY (HCC): Status: ACTIVE | Noted: 2021-09-20

## 2021-09-27 NOTE — DISCHARGE SUMMARY
Simón Sadler  Patient Status:  Inpatient    1940 MRN [id number]   [facility] 5NW-A Attending No att. providers found   Hosp Day # 1 PCP Debbie Youssef MD     Date of Admission: 2021  Date much to take      Take 0.5 tablets (5 mg total) by mouth daily. Quantity: 30 tablet  Refills: 5     metoprolol succinate 25 MG Tb24  Commonly known as:  Toprol XL  What changed:   · medication strength  · how much to take  · when to take this      Take 1 as possible for a visit in 1 month  Nephrology follow up    Appointments for Next 30 Days 9/26/2021 - 10/26/2021              Date Arrival Time Visit Type Length Department Provider     9/27/2021 11:00 AM  DIAGNOSTIC PROCEDURE [1124] 30 min Cardiology - [facility]               10/1/2021  2:00 PM  EST PT OFFICE VISIT [4951] 15 min Internal Medicine - Aguila Beyer MD    Patient Instructions:      For the safety of our patients, visitors and care teams, all patients and PLAN:   79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     COVID 19 PNA  Acute Respiratory Failure with hypoxia  -Pt vaccinated, last shot in July  -pt covid pos  -ID co

## 2021-11-08 NOTE — DISCHARGE SUMMARY
General Medicine Discharge Summary     Patient ID:  Rosalio Og.  79year old  2/12/1940    Admit date: 7/6/2021    Discharge date and time: 7/8/2021 12:22 PM     Attending Physician: Stacey att. provide times daily. , Historical    CALCIUM OR  Take 1 tablet by mouth daily. , Historical    Cholecalciferol (VITAMIN D3 OR)  Take 1 tablet by mouth daily. , Historical    Multiple Vitamins-Minerals (MULTIVITAMIN ADULTS OR)  Take 1 tablet by mouth daily.   , His

## 2022-04-04 PROBLEM — I20.89 OTHER FORMS OF ANGINA PECTORIS: Status: ACTIVE | Noted: 2022-04-04

## 2022-04-04 PROBLEM — I20.8 OTHER FORMS OF ANGINA PECTORIS (HCC): Status: ACTIVE | Noted: 2022-04-04

## 2022-04-04 PROBLEM — I20.89 OTHER FORMS OF ANGINA PECTORIS (HCC): Status: ACTIVE | Noted: 2022-04-04

## 2022-04-06 PROBLEM — I20.89 OTHER FORMS OF ANGINA PECTORIS (HCC): Status: RESOLVED | Noted: 2022-04-04 | Resolved: 2022-04-06

## 2022-04-06 PROBLEM — I20.8 OTHER FORMS OF ANGINA PECTORIS (HCC): Status: RESOLVED | Noted: 2022-04-04 | Resolved: 2022-04-06

## 2022-04-06 PROBLEM — I20.89 OTHER FORMS OF ANGINA PECTORIS: Status: RESOLVED | Noted: 2022-04-04 | Resolved: 2022-04-06

## 2022-06-21 NOTE — ED PROVIDER NOTES
Patient Seen in: [facility] Emergency Department      History   Patient presents with:  Difficulty Breathing  Fatigue    Stated Complaint: dizziness, shortness of breath, fatigue, cough, diarrhea, frequent falls, pacem*    HPI/Subjective:   HPI  Ple HPI.  Constitutional and vital signs reviewed. All other systems reviewed and negative except as noted above.     Physical Exam     ED Triage Vitals [09/05/21 1724]   /74   Pulse 120   Resp 18   Temp 98 °F (36.7 °C)   Temp src Temporal   SpO2 100 General: No tenderness or deformity. Normal range of motion. Cervical back: Normal range of motion and neck supple. Right lower leg: No edema.       Comments: Abrasion and bruising on left lower leg   Lymphadenopathy:      Cervical: No cervical ad CBC WITH DIFFERENTIAL WITH PLATELET    Narrative: The following orders were created for panel order CBC With Differential With Platelet.   Procedure                               Abnormality         Status                     --------- pacemaker placed 2 months ago         PATIENT STATED HISTORY: (As transcribed by Technologist)  Patient offered     no additional history at this time. FINDINGS:           Emphysematous changes are noted.          Left-sided pacemaker and katie be an atrial tachycardia with a right bundle branch block when adenosine slowed down and it looks like there were flutter or fib waves between.   Therefore, started IV fluids patient was placed actually on a nonrebreather I do not suspect he was actually hy floor.  Very close supervision throughout time in the emergency department  Admission disposition: 9/5/2021  7:38 PM       Dr Pradip Sosa from cardiology  Spoke with hospitalist Dr Milton Thakkar  ID is Dr Erwin Paul                         Disposition and Plan     Clinical DISPLAY PLAN FREE TEXT

## 2023-05-03 RX ORDER — CARVEDILOL 6.25 MG/1
6.25 TABLET ORAL 2 TIMES DAILY
Qty: 60 TABLET | Refills: 11 | COMMUNITY
Start: 2023-03-07 | End: 2023-05-25

## 2023-05-03 RX ORDER — LOSARTAN POTASSIUM 25 MG/1
TABLET ORAL DAILY
COMMUNITY
End: 2023-05-25

## 2023-05-16 ENCOUNTER — ANESTHESIA EVENT (OUTPATIENT)
Dept: SURGERY | Facility: HOSPITAL | Age: 83
End: 2023-05-16

## 2023-05-18 ENCOUNTER — ANESTHESIA (OUTPATIENT)
Dept: SURGERY | Facility: HOSPITAL | Age: 83
End: 2023-05-18

## 2023-05-18 ENCOUNTER — HOSPITAL ENCOUNTER (OUTPATIENT)
Dept: GENERAL RADIOLOGY | Facility: HOSPITAL | Age: 83
Discharge: HOME | DRG: 669 | End: 2023-05-18
Attending: UROLOGY
Payer: MEDICARE

## 2023-05-18 ENCOUNTER — HOSPITAL ENCOUNTER (OUTPATIENT)
Facility: HOSPITAL | Age: 83
Setting detail: HOSPITAL OUTPATIENT SURGERY
Discharge: HOME | DRG: 669 | End: 2023-05-18
Attending: UROLOGY | Admitting: UROLOGY
Payer: MEDICARE

## 2023-05-18 ENCOUNTER — HOSPITAL ENCOUNTER (OUTPATIENT)
Dept: GENERAL RADIOLOGY | Facility: HOSPITAL | Age: 83
Discharge: HOME | End: 2023-05-18
Attending: UROLOGY
Payer: MEDICARE

## 2023-05-18 ENCOUNTER — HOSPITAL ENCOUNTER (OUTPATIENT)
Facility: HOSPITAL | Age: 83
Setting detail: HOSPITAL OUTPATIENT SURGERY
Discharge: HOME | End: 2023-05-18
Attending: UROLOGY | Admitting: UROLOGY
Payer: MEDICARE

## 2023-05-18 VITALS
HEART RATE: 79 BPM | SYSTOLIC BLOOD PRESSURE: 172 MMHG | WEIGHT: 157 LBS | DIASTOLIC BLOOD PRESSURE: 72 MMHG | BODY MASS INDEX: 22.48 KG/M2 | HEIGHT: 70 IN | TEMPERATURE: 98 F | RESPIRATION RATE: 12 BRPM | OXYGEN SATURATION: 95 %

## 2023-05-18 DIAGNOSIS — D49.4 BLADDER TUMOR: Primary | ICD-10-CM

## 2023-05-18 DIAGNOSIS — N21.0: ICD-10-CM

## 2023-05-18 PROCEDURE — 0TBB8ZZ EXCISION OF BLADDER, VIA NATURAL OR ARTIFICIAL OPENING ENDOSCOPIC: ICD-10-PCS | Performed by: UROLOGY

## 2023-05-18 PROCEDURE — 88305 TISSUE EXAM BY PATHOLOGIST: CPT | Performed by: UROLOGY

## 2023-05-18 RX ORDER — ACETAMINOPHEN 500 MG
1000 TABLET ORAL ONCE
Status: DISCONTINUED | OUTPATIENT
Start: 2023-05-18 | End: 2023-05-18 | Stop reason: HOSPADM

## 2023-05-18 RX ORDER — METOCLOPRAMIDE HYDROCHLORIDE 5 MG/ML
10 INJECTION INTRAMUSCULAR; INTRAVENOUS EVERY 8 HOURS PRN
Status: DISCONTINUED | OUTPATIENT
Start: 2023-05-18 | End: 2023-05-18

## 2023-05-18 RX ORDER — HYDROCODONE BITARTRATE AND ACETAMINOPHEN 5; 325 MG/1; MG/1
2 TABLET ORAL ONCE AS NEEDED
Status: DISCONTINUED | OUTPATIENT
Start: 2023-05-18 | End: 2023-05-18

## 2023-05-18 RX ORDER — LIDOCAINE HYDROCHLORIDE 10 MG/ML
INJECTION, SOLUTION EPIDURAL; INFILTRATION; INTRACAUDAL; PERINEURAL AS NEEDED
Status: DISCONTINUED | OUTPATIENT
Start: 2023-05-18 | End: 2023-05-18 | Stop reason: SURG

## 2023-05-18 RX ORDER — LABETALOL HYDROCHLORIDE 5 MG/ML
5 INJECTION, SOLUTION INTRAVENOUS EVERY 5 MIN PRN
Status: DISCONTINUED | OUTPATIENT
Start: 2023-05-18 | End: 2023-05-18

## 2023-05-18 RX ORDER — HYDROMORPHONE HYDROCHLORIDE 1 MG/ML
0.4 INJECTION, SOLUTION INTRAMUSCULAR; INTRAVENOUS; SUBCUTANEOUS EVERY 5 MIN PRN
Status: DISCONTINUED | OUTPATIENT
Start: 2023-05-18 | End: 2023-05-18

## 2023-05-18 RX ORDER — ACETAMINOPHEN 500 MG
1000 TABLET ORAL ONCE AS NEEDED
Status: DISCONTINUED | OUTPATIENT
Start: 2023-05-18 | End: 2023-05-18

## 2023-05-18 RX ORDER — ONDANSETRON 2 MG/ML
4 INJECTION INTRAMUSCULAR; INTRAVENOUS EVERY 6 HOURS PRN
Status: DISCONTINUED | OUTPATIENT
Start: 2023-05-18 | End: 2023-05-18

## 2023-05-18 RX ORDER — ONDANSETRON 2 MG/ML
INJECTION INTRAMUSCULAR; INTRAVENOUS AS NEEDED
Status: DISCONTINUED | OUTPATIENT
Start: 2023-05-18 | End: 2023-05-18 | Stop reason: SURG

## 2023-05-18 RX ORDER — HYDRALAZINE HYDROCHLORIDE 20 MG/ML
5 INJECTION INTRAMUSCULAR; INTRAVENOUS EVERY 10 MIN PRN
Status: DISCONTINUED | OUTPATIENT
Start: 2023-05-18 | End: 2023-05-18

## 2023-05-18 RX ORDER — HYDROMORPHONE HYDROCHLORIDE 1 MG/ML
0.6 INJECTION, SOLUTION INTRAMUSCULAR; INTRAVENOUS; SUBCUTANEOUS EVERY 5 MIN PRN
Status: DISCONTINUED | OUTPATIENT
Start: 2023-05-18 | End: 2023-05-18

## 2023-05-18 RX ORDER — AMLODIPINE BESYLATE 5 MG/1
10 TABLET ORAL DAILY
COMMUNITY
Start: 2023-05-06 | End: 2023-05-25

## 2023-05-18 RX ORDER — DIPHENHYDRAMINE HYDROCHLORIDE 50 MG/ML
12.5 INJECTION INTRAMUSCULAR; INTRAVENOUS AS NEEDED
Status: DISCONTINUED | OUTPATIENT
Start: 2023-05-18 | End: 2023-05-18

## 2023-05-18 RX ORDER — HYDROMORPHONE HYDROCHLORIDE 1 MG/ML
0.2 INJECTION, SOLUTION INTRAMUSCULAR; INTRAVENOUS; SUBCUTANEOUS EVERY 5 MIN PRN
Status: DISCONTINUED | OUTPATIENT
Start: 2023-05-18 | End: 2023-05-18

## 2023-05-18 RX ORDER — CEFAZOLIN SODIUM/WATER 2 G/20 ML
2 SYRINGE (ML) INTRAVENOUS ONCE
Status: COMPLETED | OUTPATIENT
Start: 2023-05-18 | End: 2023-05-18

## 2023-05-18 RX ORDER — SODIUM CHLORIDE, SODIUM LACTATE, POTASSIUM CHLORIDE, CALCIUM CHLORIDE 600; 310; 30; 20 MG/100ML; MG/100ML; MG/100ML; MG/100ML
INJECTION, SOLUTION INTRAVENOUS CONTINUOUS
Status: DISCONTINUED | OUTPATIENT
Start: 2023-05-18 | End: 2023-05-18

## 2023-05-18 RX ORDER — DEXAMETHASONE SODIUM PHOSPHATE 4 MG/ML
VIAL (ML) INJECTION AS NEEDED
Status: DISCONTINUED | OUTPATIENT
Start: 2023-05-18 | End: 2023-05-18 | Stop reason: SURG

## 2023-05-18 RX ORDER — HYDROCODONE BITARTRATE AND ACETAMINOPHEN 5; 325 MG/1; MG/1
1 TABLET ORAL ONCE AS NEEDED
Status: DISCONTINUED | OUTPATIENT
Start: 2023-05-18 | End: 2023-05-18

## 2023-05-18 RX ORDER — NALOXONE HYDROCHLORIDE 0.4 MG/ML
80 INJECTION, SOLUTION INTRAMUSCULAR; INTRAVENOUS; SUBCUTANEOUS AS NEEDED
Status: DISCONTINUED | OUTPATIENT
Start: 2023-05-18 | End: 2023-05-18

## 2023-05-18 RX ADMIN — SODIUM CHLORIDE, SODIUM LACTATE, POTASSIUM CHLORIDE, CALCIUM CHLORIDE: 600; 310; 30; 20 INJECTION, SOLUTION INTRAVENOUS at 12:59:00

## 2023-05-18 RX ADMIN — CEFAZOLIN SODIUM/WATER 2 G: 2 G/20 ML SYRINGE (ML) INTRAVENOUS at 13:10:00

## 2023-05-18 RX ADMIN — DEXAMETHASONE SODIUM PHOSPHATE 4 MG: 4 MG/ML VIAL (ML) INJECTION at 13:15:00

## 2023-05-18 RX ADMIN — LIDOCAINE HYDROCHLORIDE 50 MG: 10 INJECTION, SOLUTION EPIDURAL; INFILTRATION; INTRACAUDAL; PERINEURAL at 13:03:00

## 2023-05-18 RX ADMIN — ONDANSETRON 4 MG: 2 INJECTION INTRAMUSCULAR; INTRAVENOUS at 13:05:00

## 2023-05-18 NOTE — DISCHARGE INSTRUCTIONS
HOLD ELIQUIS TILL Sunday -- HOLD Thurs, Friday and Saturday     Dee to night bag when in house     OK to shower     Follow-up in 1 week to remove the Dee  in office -- [address] [id number] [address] office on Friday     Bactrim antiBX x 7 days ( sent to pharmacy already)     Avoid constipation -- take stool softeners

## 2023-05-18 NOTE — ADDENDUM NOTE
Addendum  created 05/18/23 1503 by Madeline Gill CRNA    Order list changed, Pharmacy for encounter modified [facility] ENCOUNTER  PULMONARY OFFICE VISIT    IMPRESSION:     1. BESS on CPAP          Doing well on her present pressure settings. Some minor mask issues.      PLAN:     Decreased CPAP pressure to 7 cm   Switched to nasal pillow system when eligible for new mask-Will need a mask fitting  Cloth interface lining  Renewal of CPAP supplies    Return in about 1 year (around 5/29/2020).        CHIEF COMPLAINT:  BESS on CPAP    SUBJECTIVE:  Renee Bryant returns for follow up.  Last seen November 2017.  Since last seen, she reports that she is doing well with her CPAP unit. Uses it nightly. The nasal mask does look a little bit and she wants to switch to a nasal pillow system. Denies any significant excessive daytime drowsiness. [place] score is 4..    HISTORIES:    Past Medical History:   Diagnosis Date   • Anxiety     situational   • Essential (primary) hypertension    • GERD    • Hypothyroidism    • Insomnia    • Mass of shoulder region     left shoulder   • Prediabetes    • RLS (restless legs syndrome)    • Rotator cuff tear     left shoulder   • Sleep apnea     CPAP   • Unspecified vitamin D deficiency    • Wears dentures    • Wears glasses       Past Surgical History:   Procedure Laterality Date   • Appendectomy     • Bone biopsy Left 11/3/2015    proximal humerus- negative for malignancy. Dr Julian. [facility]   • Colonoscopy  06/01/2011   • Esophagogastroduodenoscopy  06/01/2011   • Humerus biopsy Left 8/17/2015    [facility]   • Remove tonsils/adenoids,<13 y/o      Tonsillectomy w Adenoids (child)   • Shoulder arthroscopy Left 12-9-15    rotator cuff repair   • Tonsillectomy and adenoidectomy        ALLERGIES:  No Known Allergies   Social History     Tobacco Use   • Smoking status: Current Every Day Smoker     Packs/day: 0.50     Years: 43.00     Pack years: 21.50     Types: Cigarettes     Start date: 7/9/2017   • Smokeless tobacco: Never Used   • Tobacco comment: Declines smoking cessation today. She has patches  at home.   Substance Use Topics   • Alcohol use: No     Alcohol/week: 0.0 oz     Comment: rarely, 2drinks/month      Family History   Problem Relation Age of Onset   • Cancer Mother         Breast   • Cancer Father         Stomach   • Depression Brother    • Asthma Daughter            Current Outpatient Medications   Medication Sig Dispense Refill   • baclofen (LIORESAL) 10 MG tablet Take 1/2 to 1 tablet by mouth twice daily as needed for spasms. Driving precautions. 45 tablet 0   • cyclobenzaprine (FLEXERIL) 5 MG tablet TAKE 2 TABLETS BY MOUTH TWICE DAILY 60 tablet 0   • rOPINIRole (REQUIP) 0.5 MG tablet TAKE 5 TABLETS BY MOUTH ONCE DAILY AT BEDTIME 150 tablet 2   • ibuprofen (MOTRIN) 600 MG tablet Take 1 tablet by mouth every 6 hours as needed for Pain. 30 tablet 0   • Ascorbic Acid (VITAMIN C) 500 MG tablet Take 500 mg by mouth daily.     • Omega-3 Fatty Acids (OMEGA 3 PO) Take 500 mg by mouth daily.     • Multiple Vitamins-Minerals (MULTIVITAMIN ADULT PO) Take 1 tablet by mouth daily.     • levothyroxine (SYNTHROID, LEVOTHROID) 137 MCG tablet Take 1 tablet by mouth daily. 90 tablet 3   • lisinopril (ZESTRIL) 20 MG tablet Take 1 tablet by mouth daily. 90 tablet 3   • mirtazapine (REMERON) 15 MG tablet Take 1 tablet by mouth nightly. 90 tablet 3   • omeprazole (PRILOSEC) 40 MG capsule Take 40 mg by mouth daily.     • VITAMIN D, CHOLECALCIFEROL, PO Take 1,000 Int'l Units by mouth.        No current facility-administered medications for this visit.        REVIEW OF SYSTEMS:    All systems reviewed and are negative with the exception of the findings noted in the History of Present Illness.    OBJECTIVE:  Vital Signs:  Visit Vitals  /80   Pulse 82   Resp 14   Ht 5' 3\" (1.6 m)   Wt 87.5 kg   SpO2 95% Comment: on room air at rest   BMI 34.19 kg/m²       PHYSICAL EXAM:  Constitutional:  Well developed, well nourished, no acute distress, non-toxic appearance.  HEENT:  Atraumatic, PERRL, conjunctivae normal, external  ears normal, nose normal, oropharynx moist, no pharyngeal exudates. Neck- Normal range of motion, no tenderness, supple, no JVD or adenopathy. No accessory muscle use. Trachea midline.  Respiratory: Clear, no wheezing, rhonchi or rub. No dullness to percussion or decreased tactile fremitus.  Cardiovascular:  Normal rate, normal rhythm, no murmurs, no gallops, no rubs.  Gastrointestinal:  Soft, nondistended, normal bowel sounds, nontender, no hepatomegaly or splenomegaly, no mass, no rebound, no guarding.  Musculoskeletal:  No edema, no tenderness, no deformities. Normal gait and station. Bilateral upper and lower extremity symmetry. No obvious defects. Normal muscle strength bilaterally upper and lower extremities. No evidence of atrophy, spasticity. No cyanosis clubbing or edema.      LABORATORY DATA:  No lab results.     IMAGING STUDIES: Radiographic images reviewed personally.      No additional films available for review    SLEEP STUDY:    Download CPAP 8 cm-AHI 0.8 events per hour of sleep      Instructions provided as documented in the After Visit Summary.      The patient indicated understanding of the diagnosis and agreed with the plan of care.    25 minutes spent in care and evaluation of the patient with greater than 50% of the time spent in counseling and coordination of care of the patient.      Foster Woody MD

## 2023-05-18 NOTE — ANESTHESIA PROCEDURE NOTES
Airway  Date/Time: 5/18/2023 1:06 PM  Urgency: elective    Airway not difficult    General Information and Staff    Patient location during procedure: OR  Anesthesiologist: Fatemeh Rajan MD  Resident/CRNA: Maisha Lantigua CRNA  Performed: CRNA   Performed by: Maisha Lantigua CRNA  Authorized by: Fatemeh Rajan MD      Indications and Patient Condition  Indications for airway management: anesthesia  Spontaneous Ventilation: absent  Sedation level: deep  Preoxygenated: yes  Patient position: sniffing  Mask difficulty assessment: 1 - vent by mask    Final Airway Details  Final airway type: supraglottic airway      Successful airway: classic  Size 3       Number of attempts at approach: 1

## 2023-05-18 NOTE — H&P
Samir Conklin is a 79year old male. RALP -- 20 + years ago   Urine control-- 1-2 pads day -- gotten used to it     Microhematuria     Gross heme -- beginning of stream     Subjective:     Chief Complaint: Procedure    Gross Heme     History/Other:     Current Outpatient Medications   Medication Sig Dispense Refill   apixaban (ELIQUIS) 2.5 MG Oral Tab TAKE 1 TABLET BY MOUTH TWICE A DAY 60 tablet 4   carvedilol 6.25 MG Oral Tab Take 1 tablet (6.25 mg total) by mouth 2 (two) times daily. 60 tablet 11   DULoxetine (CYMBALTA) 30 MG Oral Cap DR Particles Take 1 capsule (30 mg total) by mouth daily. 30 capsule 11   losartan 25 MG Oral Tab Take 1 tablet (25 mg total) by mouth daily. 90 tablet 2   amLODIPine 5 MG Oral Tab Take 2 tablets (10 mg total) by mouth daily. 180 tablet 3   Budesonide-Formoterol Fumarate (SYMBICORT) 160-4.5 MCG/ACT Inhalation Aerosol Inhale 2 puffs into the lungs 2 (two) times daily. 3 each 3   Tiotropium Bromide Monohydrate (SPIRIVA RESPIMAT) 2.5 MCG/ACT Inhalation Aero Soln Inhale 2 puffs into the lungs daily. 3 each 3   ferrous sulfate 325 (65 FE) MG Oral Tab EC Take 1 tablet (325 mg total) by mouth daily with breakfast. 30 tablet 0   latanoprost 0.005 % Ophthalmic Solution Place 1 drop into both eyes nightly. CALCIUM OR Take 1 tablet by mouth daily. Multiple Vitamins-Minerals (MULTIVITAMIN ADULTS OR) Take 1 tablet by mouth daily. Ascorbic Acid (VITAMIN C OR) Take 1 tablet by mouth daily. omeprazole 20 MG Oral Capsule Delayed Release Take 20 mg by mouth every evening.      Past Medical History:   Diagnosis Date   Gonzaelz esophagus   Benign essential HTN   Cancer (HCC)   Cataract   COPD (chronic obstructive pulmonary disease) ([facility] [address]   COVID-19 9/5/2021   Glaucoma   High blood pressure   History of blood transfusion   History of COVID-19   History of prostate cancer   History of stomach ulcers   Osteoarthritis   Renal disorder   Visual impairment     Past Surgical History:   Procedure Laterality Date   CATARACT   COLONOSCOPY   OTHER   radical prostatectomy   OTHER SURGICAL HISTORY 2023   Cysto - Dr. Merlin Perez 2023   Cysto-Dr. Manny Steve   UPPER GI ENDOSCOPY,EXAM     Social History  Tobacco Use  Smoking status: Former  Packs/day: 1.00  Types: Cigarettes  Start date: 1959  Quit date: 1985  Years since quittin.3  Smokeless tobacco: Never  Vaping Use  Vaping Use: Never used  Alcohol use: Yes  Drug use: Never      REVIEW OF SYSTEMS:     VSS   GENERAL HEALTH: feels well otherwise  SKIN: denies any unusual skin lesions or rashes  RESPIRATORY: denies shortness of breath with exertion  CARDIOVASCULAR: denies chest pain on exertion  GI: denies abdominal pain and denies heartburn  : See HPI  NEURO: denies headaches    Objective:     GENERAL: well developed, well nourished, in no apparent distress  HEENT: atraumatic, normocephalic, ears and throat are clear  NECK: supple  LUNGS: normal respiratory motion without distress  CARDIO:NA  GI: NA  :Penis:circumcised. Testicles: No masses or tenderness. Prostate:Deferred Is soft, smooth, no nodules. MUSCULOSKELETAL: FROM, Normal gait  NEURO: Alert   EXTREMITIES: No edema or cyanosis     Procedure : Cystoscopy   Anesthesia: 2% Intraurethral Lidocaine Jelly  Complications: None  [place] was placed in the supine position, and prepped and draped in the usual standard sterile fashion. 2% lidocaine jelly was inserted intraurethrally via the meatus. The meatus was then intubated with a flexible cystoscope, and visual cystourethroscopic examination was initiated under sterile water irrigation. The following findings were noted:  Findings:  Meatus was normal.  Penile urethra was normal.  Bulbar urethra was normal.  Membranous urethra was normal.  Prostatic urethra was 2-3 cm   Bladder neck was normal.  Ureteral orifices were identified and were normal in number, size, and shape.   Trabeculation observed: severe   Bladder was then examined in its entirety in a systematic fashion. Retroflexion demonstrated no abnormalities. Mucosal Abnormalities: None. 1 cm BT seems LG on the back wall -- then another 3 cm floor BT -- post to the L uo   When the procedure was completed, the cystoscope was removed. The patient tolerated the procedure well. Assessment & Plan:     CAP SP RALP -- JUAN to date   PSA has been zero and over 20 years -- he is essentially cured     2. BISI -- Cymbalta 30 mg qday   PVR - min    3. Micro heme > 30 rbc / hpf and HO Gross x 1 - but on Eliquis   US in 2021 - negative     Cr - 2.2     RBUS -- negative     U Cytology -- Highly suspicious     Cigarette smoker 1 ppd -- x 20 years     CTU would be a good idea but as he had a reaction to iodine -- also Cr too high     MR U -- will schedule -- never got     3. BT -- l floor near L UO and post wall     Looks all LG     Will schedule for a TURBT and do BL RPG at the same time     Will also instill Gemcitabine     Needs to be off Eliquis x 2 days     Informed Consent. All risks, benefits and alternatives to surgery were discussed with the patient in detail. Typical risks of wound infection, bleeding, UTI, urosepsis, prolonged hospitalization, surgical failure, and need for further surgery, were discussed with patient in detail. In addition, further very rare inherent risks specific to this surgery were detailed to the patient, such as possible bowel and major vascular injury. Differing possible surgical options and approaches were also discussed. All patient questions were answered. The patient wishes to proceed with surgery as discussed.  Will schedule accordingly     Diagnoses and all orders for this visit:    Malignant neoplasm of urinary bladder, unspecified site (HCC)  - URINALYSIS, AUTO, W/O SCOPE -- negative

## 2023-05-18 NOTE — BRIEF OP NOTE
Pre-Operative Diagnosis: BLADDER TUMORS ( > 3 cm)      Post-Operative Diagnosis: BLADDER TUMOR ( > 3 cm )      Procedure Performed:   CYSTOSCOPY, TRANSURETHRAL RESECTION OF BLADDER TUMOR, INSTILLATION OF MEDICATION, GEMCITIBINE, BILATERAL RETROGRADE PYELOGRAM  BLADDER BIOPSY OF TUMOR BASE   Surgeon(s) and Role:     * Almita Samson MD - Primary    Assistant(s):   NONE      Surgical Findings: BT x 3 -- 2 on the left floor and 1 on post wall      Specimen: BT and Bladder BX .       Estimated Blood Loss: No data recorded    Dictation Number: See op note     Concepción Norwood MD  5/18/2023  2:00 PM

## 2023-05-18 NOTE — OPERATIVE REPORT
Pre-Operative Diagnosis: BLADDER TUMORS ( > 3 cm)      Post-Operative Diagnosis: BLADDER TUMOR ( > 3 cm )      Procedure Performed:   CYSTOSCOPY, TRANSURETHRAL RESECTION OF BLADDER TUMOR, INSTILLATION OF MEDICATION, GEMCITIBINE, BILATERAL RETROGRADE PYELOGRAM  BLADDER BIOPSY OF TUMOR BASE   Surgeon(s) and Role:     * Juni Power MD - Primary    Assistant(s):   NONE      Surgical Findings: BT x 3 -- 2 on the left floor and 1 on post wall      Specimen: BT and Bladder BX . Estimated Blood Loss: No data recorded    Operation Summary:    The patient was well-defined in the operating room table he was draped and prepped in usual sterile fashion in lithotomy position he underwent a standard timeout procedure received 2 g of Ancef and Venodyne's within hour of surgery. Initially we placed continuous flow resectoscope into the bladder using Prince obturator. The urethra was initially calibrated to 28 [place] before placing the resectoscope. The prostate was relatively nonobstructive. On examination of the bladder was noted to have a 1 cm bladder tumor on the posterior wall near to the dome and then on the left side close to the ureteral orifice ease he had another 2 bladder tumors close to each other about 2 cm from the left ureteral orifice there was together at least 3 cm in size. It did appear papillary in nature. We switched out the resectoscope and placed a 22 [place] rigid cystoscope and with a 6 [place] ureteral catheter and a guidewire we performed bilateral retrograde urogram's 5 cc of contrast injected on the left side this noted the delicate collecting system and no filling defects we also then injected contrast on the right side again delicate collecting system and no evidence of filling defect or tumor.   After doing so we placed the resectoscope back into the bladder in standard fashion and we resected the bladder tumors on the floor using a saline bipolar electrocautery we resected it nicely to muscle there resection was about 3 x 3 cm we then used electrocautery to fulgurate the dermal layer for good hemostasis we then resected the posterior wall near the dome on the left side bladder tumor here again we were fairly thin and then that we could see the perinephric fat. .  We then switched over to our cystoscope and took cold cup biopsies of the tumor base at the floor trigone level. We sent this as a separate specimen. We then placed a 20 Mongolian Dee catheter 7 cc in the balloon we then and instilled 100 cc 2 g gentleman into the bladder. We decided to leave it in for 30 minutes based on thinness of the resection. Patient tolerated procedure well his abdomen was soft and nontender at the end of the case. Dee catheter gravity drainage.     Colt Kaiser MD  5/18/2023  2:00 PM

## 2023-05-20 ENCOUNTER — APPOINTMENT (OUTPATIENT)
Dept: GENERAL RADIOLOGY | Facility: HOSPITAL | Age: 83
End: 2023-05-20
Attending: EMERGENCY MEDICINE
Payer: MEDICARE

## 2023-05-20 ENCOUNTER — HOSPITAL ENCOUNTER (INPATIENT)
Facility: HOSPITAL | Age: 83
LOS: 4 days | Discharge: HOME HEALTH | End: 2023-05-25
Attending: EMERGENCY MEDICINE | Admitting: INTERNAL MEDICINE
Payer: MEDICARE

## 2023-05-20 ENCOUNTER — HOSPITAL ENCOUNTER (INPATIENT)
Facility: HOSPITAL | Age: 83
LOS: 4 days | Discharge: HOME | DRG: 669 | End: 2023-05-25
Attending: EMERGENCY MEDICINE | Admitting: INTERNAL MEDICINE
Payer: MEDICARE

## 2023-05-20 ENCOUNTER — APPOINTMENT (OUTPATIENT)
Dept: GENERAL RADIOLOGY | Facility: HOSPITAL | Age: 83
DRG: 669 | End: 2023-05-20
Attending: EMERGENCY MEDICINE
Payer: MEDICARE

## 2023-05-20 DIAGNOSIS — R73.9 HYPERGLYCEMIA: ICD-10-CM

## 2023-05-20 DIAGNOSIS — R53.83 OTHER FATIGUE: ICD-10-CM

## 2023-05-20 DIAGNOSIS — R50.9 FEVER IN ADULT: ICD-10-CM

## 2023-05-20 DIAGNOSIS — D64.9 ANEMIA, UNSPECIFIED TYPE: ICD-10-CM

## 2023-05-20 DIAGNOSIS — D72.829 LEUKOCYTOSIS, UNSPECIFIED TYPE: ICD-10-CM

## 2023-05-20 DIAGNOSIS — N39.0 ACUTE UTI: ICD-10-CM

## 2023-05-20 DIAGNOSIS — N28.9 ACUTE ON CHRONIC RENAL INSUFFICIENCY: ICD-10-CM

## 2023-05-20 DIAGNOSIS — Z78.9 FAILURE OF OUTPATIENT TREATMENT: ICD-10-CM

## 2023-05-20 DIAGNOSIS — N18.9 ACUTE ON CHRONIC RENAL INSUFFICIENCY: ICD-10-CM

## 2023-05-20 DIAGNOSIS — R06.2 WHEEZING: Primary | ICD-10-CM

## 2023-05-20 LAB
ALBUMIN SERPL-MCNC: 3 G/DL (ref 3.4–5)
ALBUMIN/GLOB SERPL: 0.8 {RATIO} (ref 1–2)
ALP LIVER SERPL-CCNC: 59 U/L
ALT SERPL-CCNC: 14 U/L
ANION GAP SERPL CALC-SCNC: 5 MMOL/L (ref 0–18)
AST SERPL-CCNC: 18 U/L (ref 15–37)
BASOPHILS # BLD AUTO: 0.03 X10(3) UL (ref 0–0.2)
BASOPHILS NFR BLD AUTO: 0.2 %
BILIRUB SERPL-MCNC: 0.7 MG/DL (ref 0.1–2)
BILIRUB UR QL STRIP.AUTO: NEGATIVE
BUN BLD-MCNC: 40 MG/DL (ref 7–18)
CALCIUM BLD-MCNC: 8.9 MG/DL (ref 8.5–10.1)
CHLORIDE SERPL-SCNC: 105 MMOL/L (ref 98–112)
CLARITY UR REFRACT.AUTO: CLEAR
CO2 SERPL-SCNC: 23 MMOL/L (ref 21–32)
COLOR UR AUTO: YELLOW
CREAT BLD-MCNC: 2.53 MG/DL
EOSINOPHIL # BLD AUTO: 0.03 X10(3) UL (ref 0–0.7)
EOSINOPHIL NFR BLD AUTO: 0.2 %
ERYTHROCYTE [DISTWIDTH] IN BLOOD BY AUTOMATED COUNT: 12.5 %
FLUAV + FLUBV RNA SPEC NAA+PROBE: NEGATIVE
FLUAV + FLUBV RNA SPEC NAA+PROBE: NEGATIVE
GFR SERPLBLD BASED ON 1.73 SQ M-ARVRAT: 24 ML/MIN/1.73M2 (ref 60–?)
GLOBULIN PLAS-MCNC: 3.8 G/DL (ref 2.8–4.4)
GLUCOSE BLD-MCNC: 112 MG/DL (ref 70–99)
GLUCOSE UR STRIP.AUTO-MCNC: NEGATIVE MG/DL
HCT VFR BLD AUTO: 35.6 %
HGB BLD-MCNC: 12.2 G/DL
IMM GRANULOCYTES # BLD AUTO: 0.1 X10(3) UL (ref 0–1)
IMM GRANULOCYTES NFR BLD: 0.6 %
KETONES UR STRIP.AUTO-MCNC: NEGATIVE MG/DL
LACTATE SERPL-SCNC: 1.6 MMOL/L (ref 0.4–2)
LIPASE SERPL-CCNC: 18 U/L (ref 13–75)
LYMPHOCYTES # BLD AUTO: 0.57 X10(3) UL (ref 1–4)
LYMPHOCYTES NFR BLD AUTO: 3.4 %
MCH RBC QN AUTO: 33.2 PG (ref 26–34)
MCHC RBC AUTO-ENTMCNC: 34.3 G/DL (ref 31–37)
MCV RBC AUTO: 97 FL
MONOCYTES # BLD AUTO: 0.14 X10(3) UL (ref 0.1–1)
MONOCYTES NFR BLD AUTO: 0.8 %
NEUTROPHILS # BLD AUTO: 15.7 X10 (3) UL (ref 1.5–7.7)
NEUTROPHILS # BLD AUTO: 15.7 X10(3) UL (ref 1.5–7.7)
NEUTROPHILS NFR BLD AUTO: 94.8 %
NITRITE UR QL STRIP.AUTO: NEGATIVE
OSMOLALITY SERPL CALC.SUM OF ELEC: 287 MOSM/KG (ref 275–295)
PH UR STRIP.AUTO: 5 [PH] (ref 5–8)
PLATELET # BLD AUTO: 180 10(3)UL (ref 150–450)
POTASSIUM SERPL-SCNC: 4.1 MMOL/L (ref 3.5–5.1)
PROT SERPL-MCNC: 6.8 G/DL (ref 6.4–8.2)
PROT UR STRIP.AUTO-MCNC: 30 MG/DL
RBC # BLD AUTO: 3.67 X10(6)UL
RSV RNA SPEC NAA+PROBE: NEGATIVE
SARS-COV-2 RNA RESP QL NAA+PROBE: NOT DETECTED
SODIUM SERPL-SCNC: 133 MMOL/L (ref 136–145)
SP GR UR STRIP.AUTO: 1.01 (ref 1–1.03)
UROBILINOGEN UR STRIP.AUTO-MCNC: <2 MG/DL
WBC # BLD AUTO: 16.6 X10(3) UL (ref 4–11)

## 2023-05-20 PROCEDURE — 87086 URINE CULTURE/COLONY COUNT: CPT | Performed by: EMERGENCY MEDICINE

## 2023-05-20 PROCEDURE — 87040 BLOOD CULTURE FOR BACTERIA: CPT | Performed by: EMERGENCY MEDICINE

## 2023-05-20 PROCEDURE — 99285 EMERGENCY DEPT VISIT HI MDM: CPT

## 2023-05-20 PROCEDURE — 87430 STREP A AG IA: CPT | Performed by: EMERGENCY MEDICINE

## 2023-05-20 PROCEDURE — 36415 COLL VENOUS BLD VENIPUNCTURE: CPT

## 2023-05-20 PROCEDURE — 71045 X-RAY EXAM CHEST 1 VIEW: CPT | Performed by: EMERGENCY MEDICINE

## 2023-05-20 PROCEDURE — 94640 AIRWAY INHALATION TREATMENT: CPT

## 2023-05-20 PROCEDURE — 96365 THER/PROPH/DIAG IV INF INIT: CPT

## 2023-05-20 PROCEDURE — 85025 COMPLETE CBC W/AUTO DIFF WBC: CPT | Performed by: EMERGENCY MEDICINE

## 2023-05-20 PROCEDURE — 81001 URINALYSIS AUTO W/SCOPE: CPT | Performed by: EMERGENCY MEDICINE

## 2023-05-20 PROCEDURE — 80053 COMPREHEN METABOLIC PANEL: CPT | Performed by: EMERGENCY MEDICINE

## 2023-05-20 PROCEDURE — 0241U SARS-COV-2/FLU A AND B/RSV BY PCR (GENEXPERT): CPT | Performed by: EMERGENCY MEDICINE

## 2023-05-20 PROCEDURE — 83605 ASSAY OF LACTIC ACID: CPT | Performed by: EMERGENCY MEDICINE

## 2023-05-20 PROCEDURE — 83690 ASSAY OF LIPASE: CPT | Performed by: EMERGENCY MEDICINE

## 2023-05-20 RX ORDER — ACETAMINOPHEN 500 MG
1000 TABLET ORAL ONCE
Status: COMPLETED | OUTPATIENT
Start: 2023-05-20 | End: 2023-05-20

## 2023-05-20 RX ORDER — IPRATROPIUM BROMIDE AND ALBUTEROL SULFATE 2.5; .5 MG/3ML; MG/3ML
3 SOLUTION RESPIRATORY (INHALATION) ONCE
Status: COMPLETED | OUTPATIENT
Start: 2023-05-20 | End: 2023-05-20

## 2023-05-21 PROBLEM — N28.9 ACUTE ON CHRONIC RENAL INSUFFICIENCY: Status: ACTIVE | Noted: 2023-05-21

## 2023-05-21 PROBLEM — R06.2 WHEEZING: Status: ACTIVE | Noted: 2023-05-21

## 2023-05-21 PROBLEM — N39.0 ACUTE UTI: Status: ACTIVE | Noted: 2023-05-21

## 2023-05-21 PROBLEM — R53.83 OTHER FATIGUE: Status: ACTIVE | Noted: 2023-05-21

## 2023-05-21 PROBLEM — D72.829 LEUKOCYTOSIS, UNSPECIFIED TYPE: Status: ACTIVE | Noted: 2023-05-21

## 2023-05-21 PROBLEM — N18.9 ACUTE ON CHRONIC RENAL INSUFFICIENCY: Status: ACTIVE | Noted: 2023-05-21

## 2023-05-21 PROBLEM — R73.9 HYPERGLYCEMIA: Status: ACTIVE | Noted: 2023-05-21

## 2023-05-21 PROBLEM — Z78.9 FAILURE OF OUTPATIENT TREATMENT: Status: ACTIVE | Noted: 2023-05-21

## 2023-05-21 PROBLEM — N39.0 UTI (URINARY TRACT INFECTION): Status: ACTIVE | Noted: 2023-05-21

## 2023-05-21 PROBLEM — D64.9 ANEMIA, UNSPECIFIED TYPE: Status: ACTIVE | Noted: 2023-05-21

## 2023-05-21 PROBLEM — R50.9 FEVER IN ADULT: Status: ACTIVE | Noted: 2023-05-21

## 2023-05-21 PROCEDURE — 94640 AIRWAY INHALATION TREATMENT: CPT

## 2023-05-21 RX ORDER — SODIUM CHLORIDE 9 MG/ML
INJECTION, SOLUTION INTRAVENOUS CONTINUOUS
Status: DISCONTINUED | OUTPATIENT
Start: 2023-05-21 | End: 2023-05-21

## 2023-05-21 RX ORDER — FLUTICASONE FUROATE AND VILANTEROL 200; 25 UG/1; UG/1
1 POWDER RESPIRATORY (INHALATION) DAILY
Status: DISCONTINUED | OUTPATIENT
Start: 2023-05-21 | End: 2023-05-25

## 2023-05-21 RX ORDER — POLYETHYLENE GLYCOL 3350 17 G/17G
17 POWDER, FOR SOLUTION ORAL DAILY PRN
Status: DISCONTINUED | OUTPATIENT
Start: 2023-05-21 | End: 2023-05-25

## 2023-05-21 RX ORDER — ACETAMINOPHEN 500 MG
500 TABLET ORAL EVERY 4 HOURS PRN
Status: DISCONTINUED | OUTPATIENT
Start: 2023-05-21 | End: 2023-05-25

## 2023-05-21 RX ORDER — SENNOSIDES 8.6 MG
17.2 TABLET ORAL NIGHTLY PRN
Status: DISCONTINUED | OUTPATIENT
Start: 2023-05-21 | End: 2023-05-25

## 2023-05-21 RX ORDER — BISACODYL 10 MG
10 SUPPOSITORY, RECTAL RECTAL
Status: DISCONTINUED | OUTPATIENT
Start: 2023-05-21 | End: 2023-05-25

## 2023-05-21 RX ORDER — ENOXAPARIN SODIUM 100 MG/ML
30 INJECTION SUBCUTANEOUS DAILY
Status: DISCONTINUED | OUTPATIENT
Start: 2023-05-21 | End: 2023-05-21

## 2023-05-21 RX ORDER — LATANOPROST 50 UG/ML
1 SOLUTION/ DROPS OPHTHALMIC NIGHTLY
Status: DISCONTINUED | OUTPATIENT
Start: 2023-05-21 | End: 2023-05-25

## 2023-05-21 RX ORDER — METOCLOPRAMIDE HYDROCHLORIDE 5 MG/ML
5 INJECTION INTRAMUSCULAR; INTRAVENOUS EVERY 8 HOURS PRN
Status: DISCONTINUED | OUTPATIENT
Start: 2023-05-21 | End: 2023-05-25

## 2023-05-21 RX ORDER — ALBUTEROL SULFATE 90 UG/1
2 AEROSOL, METERED RESPIRATORY (INHALATION) EVERY 6 HOURS PRN
Status: DISCONTINUED | OUTPATIENT
Start: 2023-05-21 | End: 2023-05-25

## 2023-05-21 RX ORDER — MELATONIN
3 NIGHTLY PRN
Status: DISCONTINUED | OUTPATIENT
Start: 2023-05-21 | End: 2023-05-25

## 2023-05-21 RX ORDER — SODIUM CHLORIDE 9 MG/ML
INJECTION, SOLUTION INTRAVENOUS CONTINUOUS
Status: DISCONTINUED | OUTPATIENT
Start: 2023-05-21 | End: 2023-05-22

## 2023-05-21 RX ORDER — CARVEDILOL 6.25 MG/1
6.25 TABLET ORAL 2 TIMES DAILY
Status: DISCONTINUED | OUTPATIENT
Start: 2023-05-21 | End: 2023-05-22

## 2023-05-21 RX ORDER — HEPARIN SODIUM 5000 [USP'U]/ML
5000 INJECTION, SOLUTION INTRAVENOUS; SUBCUTANEOUS EVERY 8 HOURS SCHEDULED
Status: DISCONTINUED | OUTPATIENT
Start: 2023-05-21 | End: 2023-05-21

## 2023-05-21 RX ORDER — ONDANSETRON 2 MG/ML
4 INJECTION INTRAMUSCULAR; INTRAVENOUS EVERY 6 HOURS PRN
Status: DISCONTINUED | OUTPATIENT
Start: 2023-05-21 | End: 2023-05-25

## 2023-05-21 RX ORDER — PANTOPRAZOLE SODIUM 40 MG/1
40 TABLET, DELAYED RELEASE ORAL
Status: DISCONTINUED | OUTPATIENT
Start: 2023-05-21 | End: 2023-05-25

## 2023-05-21 NOTE — PLAN OF CARE
NURSING ADMISSION NOTE    Patient admitted via Cart  Oriented to room. Safety precautions initiated. Bed in low position. Call light in reach. Assumed care of patient at 0230. Pt a/o x4. VSS. Afebrile. Denies pain. IVF infusing per MAR. Navigator completed. Pt reports recent non-productive cough. Dee catheter intact, draining clear yellow urine. Call light within reach. Safety precautions in place.     Problem: RESPIRATORY - ADULT  Goal: Achieves optimal ventilation and oxygenation  Description: INTERVENTIONS:  - Assess for changes in respiratory status  - Assess for changes in mentation and behavior  - Position to facilitate oxygenation and minimize respiratory effort  - Oxygen supplementation based on oxygen saturation or ABGs  - Provide Smoking Cessation handout, if applicable  - Encourage broncho-pulmonary hygiene including cough, deep breathe, Incentive Spirometry  - Assess the need for suctioning and perform as needed  - Assess and instruct to report SOB or any respiratory difficulty  - Respiratory Therapy support as indicated  - Manage/alleviate anxiety  - Monitor for signs/symptoms of CO2 retention  Outcome: Progressing     Problem: GASTROINTESTINAL - ADULT  Goal: Maintains adequate nutritional intake (undernourished)  Description: INTERVENTIONS:  - Monitor percentage of each meal consumed  - Identify factors contributing to decreased intake, treat as appropriate  - Assist with meals as needed  - Monitor I&O, WT and lab values  - Obtain nutritional consult as needed  - Optimize oral hygiene and moisture  - Encourage food from home; allow for food preferences  - Enhance eating environment  Outcome: Progressing     Problem: MUSCULOSKELETAL - ADULT  Goal: Return mobility to safest level of function  Description: INTERVENTIONS:  - Assess patient stability and activity tolerance for standing, transferring and ambulating w/ or w/o assistive devices  - Assist with transfers and ambulation using safe patient handling equipment as needed  - Ensure adequate protection for wounds/incisions during mobilization  - Obtain PT/OT consults as needed  - Advance activity as appropriate  - Communicate ordered activity level and limitations with patient/family  Outcome: Progressing

## 2023-05-21 NOTE — PROGRESS NOTES
[facility] Pharmacy Note:  Renal Dose Adjustment for enoxaparin (LOVENOX)    Dulce Orozco. has been prescribed enoxaparin 40 mg subcutaneously every 24 hours. Estimated Creatinine Clearance: 22.2 mL/min (A) (based on SCr of 2.53 mg/dL (H)). Calculated CrCl 20 to 30 mL/min so the dose of Enoxaparin (LOVENOX) has been changed to enoxaparin 30 mg every 24 hours per P&T approved protocol. Pharmacy will continue to follow, and make additional adjustments if needed.       Thank you,  Cash Holt, PharmD  5/21/2023 2:59 AM

## 2023-05-21 NOTE — PLAN OF CARE
Pt a/o x4. VSS, remained afebrile. Meds given per MAR. IV fluids infusing. Tylenol given prn for headache. Fall precautions in place. Call light within reach.      Problem: MUSCULOSKELETAL - ADULT  Goal: Return mobility to safest level of function  Description: INTERVENTIONS:  - Assess patient stability and activity tolerance for standing, transferring and ambulating w/ or w/o assistive devices  - Assist with transfers and ambulation using safe patient handling equipment as needed  - Ensure adequate protection for wounds/incisions during mobilization  - Obtain PT/OT consults as needed  - Advance activity as appropriate  - Communicate ordered activity level and limitations with patient/family  Outcome: Progressing     Problem: GASTROINTESTINAL - ADULT  Goal: Maintains adequate nutritional intake (undernourished)  Description: INTERVENTIONS:  - Monitor percentage of each meal consumed  - Identify factors contributing to decreased intake, treat as appropriate  - Assist with meals as needed  - Monitor I&O, WT and lab values  - Obtain nutritional consult as needed  - Optimize oral hygiene and moisture  - Encourage food from home; allow for food preferences  - Enhance eating environment  Outcome: Progressing

## 2023-05-21 NOTE — ED QUICK NOTES
Orders for admission, patient is aware of plan and ready to go upstairs. Any questions, please call ED RN Brant Garcia  at extension [phone].      Patient Covid vaccination status: Fully vaccinated     COVID Test Ordered in ED: SARS-CoV-2/Flu A and B/RSV by PCR (GeneXpert)    COVID Suspicion at Admission: N/A    Running Infusions:      Mental Status/LOC at time of transport: A&O x4    Other pertinent information: Dee in place PTA  CIWA score: N/A   NIH score:  N/A

## 2023-05-21 NOTE — ED INITIAL ASSESSMENT (HPI)
Pt presents s/p bladder CA surgery 2 days ago with fevers, progressing weakness and SOB. Dee in place.

## 2023-05-22 LAB
ANION GAP SERPL CALC-SCNC: 8 MMOL/L (ref 0–18)
ATRIAL RATE: 96 BPM
BUN BLD-MCNC: 35 MG/DL (ref 7–18)
CALCIUM BLD-MCNC: 8.1 MG/DL (ref 8.5–10.1)
CHLORIDE SERPL-SCNC: 111 MMOL/L (ref 98–112)
CO2 SERPL-SCNC: 18 MMOL/L (ref 21–32)
CREAT BLD-MCNC: 2.16 MG/DL
ERYTHROCYTE [DISTWIDTH] IN BLOOD BY AUTOMATED COUNT: 12.6 %
GFR SERPLBLD BASED ON 1.73 SQ M-ARVRAT: 29 ML/MIN/1.73M2 (ref 60–?)
GLUCOSE BLD-MCNC: 112 MG/DL (ref 70–99)
HCT VFR BLD AUTO: 31.7 %
HGB BLD-MCNC: 10.9 G/DL
MCH RBC QN AUTO: 33.6 PG (ref 26–34)
MCHC RBC AUTO-ENTMCNC: 34.4 G/DL (ref 31–37)
MCV RBC AUTO: 97.8 FL
OSMOLALITY SERPL CALC.SUM OF ELEC: 293 MOSM/KG (ref 275–295)
PLATELET # BLD AUTO: 170 10(3)UL (ref 150–450)
POTASSIUM SERPL-SCNC: 4 MMOL/L (ref 3.5–5.1)
Q-T INTERVAL: 338 MS
QRS DURATION: 126 MS
QTC CALCULATION (BEZET): 473 MS
R AXIS: -62 DEGREES
RBC # BLD AUTO: 3.24 X10(6)UL
SODIUM SERPL-SCNC: 137 MMOL/L (ref 136–145)
T AXIS: 24 DEGREES
T3FREE SERPL-MCNC: 1.35 PG/ML (ref 2.4–4.2)
T4 FREE SERPL-MCNC: 1.3 NG/DL (ref 0.8–1.7)
TSI SER-ACNC: 0.25 MIU/ML (ref 0.36–3.74)
VENTRICULAR RATE: 118 BPM
WBC # BLD AUTO: 10.8 X10(3) UL (ref 4–11)

## 2023-05-22 PROCEDURE — 93005 ELECTROCARDIOGRAM TRACING: CPT

## 2023-05-22 PROCEDURE — 84443 ASSAY THYROID STIM HORMONE: CPT | Performed by: NURSE PRACTITIONER

## 2023-05-22 PROCEDURE — 84481 FREE ASSAY (FT-3): CPT | Performed by: NURSE PRACTITIONER

## 2023-05-22 PROCEDURE — 97161 PT EVAL LOW COMPLEX 20 MIN: CPT

## 2023-05-22 PROCEDURE — 85027 COMPLETE CBC AUTOMATED: CPT | Performed by: INTERNAL MEDICINE

## 2023-05-22 PROCEDURE — 80048 BASIC METABOLIC PNL TOTAL CA: CPT | Performed by: INTERNAL MEDICINE

## 2023-05-22 PROCEDURE — 97535 SELF CARE MNGMENT TRAINING: CPT

## 2023-05-22 PROCEDURE — 97165 OT EVAL LOW COMPLEX 30 MIN: CPT

## 2023-05-22 PROCEDURE — 94640 AIRWAY INHALATION TREATMENT: CPT

## 2023-05-22 PROCEDURE — 84439 ASSAY OF FREE THYROXINE: CPT | Performed by: NURSE PRACTITIONER

## 2023-05-22 PROCEDURE — 93010 ELECTROCARDIOGRAM REPORT: CPT | Performed by: INTERNAL MEDICINE

## 2023-05-22 PROCEDURE — 97116 GAIT TRAINING THERAPY: CPT

## 2023-05-22 RX ORDER — METOPROLOL TARTRATE 5 MG/5ML
5 INJECTION INTRAVENOUS ONCE
Status: COMPLETED | OUTPATIENT
Start: 2023-05-22 | End: 2023-05-22

## 2023-05-22 RX ORDER — DILTIAZEM HYDROCHLORIDE 5 MG/ML
5 INJECTION INTRAVENOUS ONCE
Status: COMPLETED | OUTPATIENT
Start: 2023-05-22 | End: 2023-05-22

## 2023-05-22 RX ORDER — METOPROLOL TARTRATE 5 MG/5ML
5 INJECTION INTRAVENOUS ONCE AS NEEDED
Status: COMPLETED | OUTPATIENT
Start: 2023-05-22 | End: 2023-05-22

## 2023-05-22 NOTE — PROGRESS NOTES
[facility]  Patient transferred to unit    Began on cardizem drip at 10mg/hr  Patient noted to have wheezing PRN Albuterol given   Patient resting comfortable in bed

## 2023-05-22 NOTE — PLAN OF CARE
Pt. A&O x4. Afebrile. No c/o pain. Dee catheter patent draining clear yellow urine. Pt. Working with PT today; up with 1 assist with the walker. HR irregular; ranging from 80's-150's. MD aware; IV metoprolol given per [facility]. Cardiology consulted; no new orders at this time. Call light within reach. Will continue to monitor. 1530: Pts. HR remains irregular; patient to start on cardizem gtt. Pt. To be transferred to 3613. Report given to receiving RN. Pt. Updated on POC. Will continue to monitor.       Problem: RESPIRATORY - ADULT  Goal: Achieves optimal ventilation and oxygenation  Description: INTERVENTIONS:  - Assess for changes in respiratory status  - Assess for changes in mentation and behavior  - Position to facilitate oxygenation and minimize respiratory effort  - Oxygen supplementation based on oxygen saturation or ABGs  - Provide Smoking Cessation handout, if applicable  - Encourage broncho-pulmonary hygiene including cough, deep breathe, Incentive Spirometry  - Assess the need for suctioning and perform as needed  - Assess and instruct to report SOB or any respiratory difficulty  - Respiratory Therapy support as indicated  - Manage/alleviate anxiety  - Monitor for signs/symptoms of CO2 retention  Outcome: Progressing     Problem: GASTROINTESTINAL - ADULT  Goal: Maintains adequate nutritional intake (undernourished)  Description: INTERVENTIONS:  - Monitor percentage of each meal consumed  - Identify factors contributing to decreased intake, treat as appropriate  - Assist with meals as needed  - Monitor I&O, WT and lab values  - Obtain nutritional consult as needed  - Optimize oral hygiene and moisture  - Encourage food from home; allow for food preferences  - Enhance eating environment  Outcome: Progressing     Problem: MUSCULOSKELETAL - ADULT  Goal: Return mobility to safest level of function  Description: INTERVENTIONS:  - Assess patient stability and activity tolerance for standing, transferring and ambulating w/ or w/o assistive devices  - Assist with transfers and ambulation using safe patient handling equipment as needed  - Ensure adequate protection for wounds/incisions during mobilization  - Obtain PT/OT consults as needed  - Advance activity as appropriate  - Communicate ordered activity level and limitations with patient/family  Outcome: Progressing     Problem: CARDIOVASCULAR - ADULT  Goal: Maintains optimal cardiac output and hemodynamic stability  Description: INTERVENTIONS:  - Monitor vital signs, rhythm, and trends  - Monitor for bleeding, hypotension and signs of decreased cardiac output  - Evaluate effectiveness of vasoactive medications to optimize hemodynamic stability  - Monitor arterial and/or venous puncture sites for bleeding and/or hematoma  - Assess quality of pulses, skin color and temperature  - Assess for signs of decreased coronary artery perfusion - ex.  Angina  - Evaluate fluid balance, assess for edema, trend weights  Outcome: Progressing     Problem: CARDIOVASCULAR - ADULT  Goal: Absence of cardiac arrhythmias or at baseline  Description: INTERVENTIONS:  - Continuous cardiac monitoring, monitor vital signs, obtain 12 lead EKG if indicated  - Evaluate effectiveness of antiarrhythmic and heart rate control medications as ordered  - Initiate emergency measures for life threatening arrhythmias  - Monitor electrolytes and administer replacement therapy as ordered  Outcome: Progressing

## 2023-05-23 LAB
ANION GAP SERPL CALC-SCNC: 8 MMOL/L (ref 0–18)
BASOPHILS # BLD AUTO: 0.04 X10(3) UL (ref 0–0.2)
BASOPHILS NFR BLD AUTO: 0.6 %
BUN BLD-MCNC: 39 MG/DL (ref 7–18)
CALCIUM BLD-MCNC: 8.6 MG/DL (ref 8.5–10.1)
CHLORIDE SERPL-SCNC: 111 MMOL/L (ref 98–112)
CO2 SERPL-SCNC: 18 MMOL/L (ref 21–32)
CREAT BLD-MCNC: 2.18 MG/DL
EOSINOPHIL # BLD AUTO: 0.17 X10(3) UL (ref 0–0.7)
EOSINOPHIL NFR BLD AUTO: 2.6 %
ERYTHROCYTE [DISTWIDTH] IN BLOOD BY AUTOMATED COUNT: 12.7 %
GFR SERPLBLD BASED ON 1.73 SQ M-ARVRAT: 29 ML/MIN/1.73M2 (ref 60–?)
GLUCOSE BLD-MCNC: 120 MG/DL (ref 70–99)
HCT VFR BLD AUTO: 32.2 %
HGB BLD-MCNC: 10.9 G/DL
IMM GRANULOCYTES # BLD AUTO: 0.06 X10(3) UL (ref 0–1)
IMM GRANULOCYTES NFR BLD: 0.9 %
LYMPHOCYTES # BLD AUTO: 1.02 X10(3) UL (ref 1–4)
LYMPHOCYTES NFR BLD AUTO: 15.5 %
MCH RBC QN AUTO: 33.3 PG (ref 26–34)
MCHC RBC AUTO-ENTMCNC: 33.9 G/DL (ref 31–37)
MCV RBC AUTO: 98.5 FL
MONOCYTES # BLD AUTO: 0.24 X10(3) UL (ref 0.1–1)
MONOCYTES NFR BLD AUTO: 3.6 %
NEUTROPHILS # BLD AUTO: 5.06 X10 (3) UL (ref 1.5–7.7)
NEUTROPHILS # BLD AUTO: 5.06 X10(3) UL (ref 1.5–7.7)
NEUTROPHILS NFR BLD AUTO: 76.8 %
OSMOLALITY SERPL CALC.SUM OF ELEC: 295 MOSM/KG (ref 275–295)
PLATELET # BLD AUTO: 179 10(3)UL (ref 150–450)
POTASSIUM SERPL-SCNC: 3.9 MMOL/L (ref 3.5–5.1)
RBC # BLD AUTO: 3.27 X10(6)UL
SODIUM SERPL-SCNC: 137 MMOL/L (ref 136–145)
WBC # BLD AUTO: 6.6 X10(3) UL (ref 4–11)

## 2023-05-23 PROCEDURE — 80048 BASIC METABOLIC PNL TOTAL CA: CPT | Performed by: NURSE PRACTITIONER

## 2023-05-23 PROCEDURE — 85025 COMPLETE CBC W/AUTO DIFF WBC: CPT | Performed by: NURSE PRACTITIONER

## 2023-05-24 RX ORDER — DILTIAZEM HYDROCHLORIDE 5 MG/ML
10 INJECTION INTRAVENOUS ONCE
Status: COMPLETED | OUTPATIENT
Start: 2023-05-24 | End: 2023-05-24

## 2023-05-24 NOTE — PLAN OF CARE
Assumed pt care this morning at 0730. Pt is A&OX4.   RA, dyspnea with exertion, expiratory wheezes,   Tele: Afib, occ pacer spikes. Denies having pain. R AC diltizem gtt received on 15ml/hr, rate decreased currently on 5 ml/hr. Cardizem PO started. Dee-yellow urine. BM today. IV abx ceftriaxone empiric. Up with assist X1 and walker. Bed in lowest position, call light within reach, bed alarm on. Writer spoke to on call cardiologist, ok to give cardizem po at 1800 and discontinue cardizem gtt. Orders carried out. Pt refused to get into the chair today, multiples attempts made. Problem: RESPIRATORY - ADULT  Goal: Achieves optimal ventilation and oxygenation  Description: INTERVENTIONS:  - Assess for changes in respiratory status  - Assess for changes in mentation and behavior  - Position to facilitate oxygenation and minimize respiratory effort  - Oxygen supplementation based on oxygen saturation or ABGs  - Provide Smoking Cessation handout, if applicable  - Encourage broncho-pulmonary hygiene including cough, deep breathe, Incentive Spirometry  - Assess the need for suctioning and perform as needed  - Assess and instruct to report SOB or any respiratory difficulty  - Respiratory Therapy support as indicated  - Manage/alleviate anxiety  - Monitor for signs/symptoms of CO2 retention  Outcome: Progressing     Problem: CARDIOVASCULAR - ADULT  Goal: Maintains optimal cardiac output and hemodynamic stability  Description: INTERVENTIONS:  - Monitor vital signs, rhythm, and trends  - Monitor for bleeding, hypotension and signs of decreased cardiac output  - Evaluate effectiveness of vasoactive medications to optimize hemodynamic stability  - Monitor arterial and/or venous puncture sites for bleeding and/or hematoma  - Assess quality of pulses, skin color and temperature  - Assess for signs of decreased coronary artery perfusion - ex.  Angina  - Evaluate fluid balance, assess for edema, trend weights  Outcome: Progressing  Goal: Absence of cardiac arrhythmias or at baseline  Description: INTERVENTIONS:  - Continuous cardiac monitoring, monitor vital signs, obtain 12 lead EKG if indicated  - Evaluate effectiveness of antiarrhythmic and heart rate control medications as ordered  - Initiate emergency measures for life threatening arrhythmias  - Monitor electrolytes and administer replacement therapy as ordered  Outcome: Progressing     Problem: SAFETY ADULT - FALL  Goal: Free from fall injury  Description: INTERVENTIONS:  - Assess pt frequently for physical needs  - Identify cognitive and physical deficits and behaviors that affect risk of falls.   - [place] fall precautions as indicated by assessment.  - Educate pt/family on patient safety including physical limitations  - Instruct pt to call for assistance with activity based on assessment  - Modify environment to reduce risk of injury  - Provide assistive devices as appropriate  - Consider OT/PT consult to assist with strengthening/mobility  - Encourage toileting schedule  Outcome: Progressing

## 2023-05-25 VITALS
WEIGHT: 159 LBS | RESPIRATION RATE: 16 BRPM | DIASTOLIC BLOOD PRESSURE: 73 MMHG | OXYGEN SATURATION: 96 % | HEART RATE: 79 BPM | SYSTOLIC BLOOD PRESSURE: 123 MMHG | BODY MASS INDEX: 23 KG/M2 | TEMPERATURE: 98 F

## 2023-05-25 RX ORDER — DILTIAZEM HYDROCHLORIDE 120 MG/1
120 CAPSULE, COATED, EXTENDED RELEASE ORAL DAILY
Qty: 90 CAPSULE | Refills: 0 | Status: SHIPPED | OUTPATIENT
Start: 2023-05-25 | End: 2023-08-23

## 2023-05-25 RX ORDER — METOPROLOL TARTRATE 50 MG/1
50 TABLET, FILM COATED ORAL
Qty: 180 TABLET | Refills: 0 | Status: SHIPPED | OUTPATIENT
Start: 2023-05-25 | End: 2023-08-23

## 2023-05-25 RX ORDER — CEFADROXIL 500 MG/1
500 CAPSULE ORAL 2 TIMES DAILY
Qty: 4 CAPSULE | Refills: 0 | Status: SHIPPED | OUTPATIENT
Start: 2023-05-25 | End: 2023-05-27

## 2023-05-25 NOTE — PROGRESS NOTES
[facility] NURSING DISCHARGE NOTE    Discharged Home via Wheelchair. Accompanied by Family member and Support staff  Belongings Taken by patient/family. Discharge order in place. Pt was cleared for discharged by hospitalist and cardiologist. Discharge education provided to pt and family present. Pt is aware of his follow-up appointments. All medications reviewed. IV removed. Pt left with a meadows catheter in place. Pt has an appt tomorrow with his urologist to remove meadows catheter. All questions answered at the moment of discharge.

## 2023-10-02 ENCOUNTER — ORDER TRANSCRIPTION (OUTPATIENT)
Dept: CARDIAC REHAB | Facility: HOSPITAL | Age: 83
End: 2023-10-02

## 2023-10-02 DIAGNOSIS — J44.9 COPD (CHRONIC OBSTRUCTIVE PULMONARY DISEASE) (HCC): Primary | ICD-10-CM

## 2023-10-09 ENCOUNTER — CARDPULM VISIT (OUTPATIENT)
Dept: CARDIAC REHAB | Facility: HOSPITAL | Age: 83
End: 2023-10-09
Attending: INTERNAL MEDICINE
Payer: MEDICARE

## 2023-10-12 ENCOUNTER — APPOINTMENT (OUTPATIENT)
Dept: CT IMAGING | Facility: HOSPITAL | Age: 83
DRG: 066 | End: 2023-10-12
Payer: MEDICARE

## 2023-10-12 ENCOUNTER — HOSPITAL ENCOUNTER (INPATIENT)
Facility: HOSPITAL | Age: 83
LOS: 2 days | Discharge: HOME | DRG: 066 | End: 2023-10-14
Attending: EMERGENCY MEDICINE | Admitting: EMERGENCY MEDICINE
Payer: MEDICARE

## 2023-10-12 ENCOUNTER — HOSPITAL ENCOUNTER (INPATIENT)
Facility: HOSPITAL | Age: 83
LOS: 2 days | Discharge: HOME | End: 2023-10-14
Attending: EMERGENCY MEDICINE | Admitting: EMERGENCY MEDICINE
Payer: MEDICARE

## 2023-10-12 ENCOUNTER — APPOINTMENT (OUTPATIENT)
Dept: CT IMAGING | Facility: HOSPITAL | Age: 83
End: 2023-10-12
Payer: MEDICARE

## 2023-10-12 DIAGNOSIS — I63.9 ACUTE CVA (CEREBROVASCULAR ACCIDENT) (HCC): Primary | ICD-10-CM

## 2023-10-12 LAB
ALBUMIN SERPL-MCNC: 3.3 G/DL (ref 3.4–5)
ALBUMIN/GLOB SERPL: 0.8 {RATIO} (ref 1–2)
ALP LIVER SERPL-CCNC: 85 U/L
ALT SERPL-CCNC: 26 U/L
ANION GAP SERPL CALC-SCNC: 8 MMOL/L (ref 0–18)
AST SERPL-CCNC: 15 U/L (ref 15–37)
ATRIAL RATE: 70 BPM
BASOPHILS # BLD AUTO: 0.05 X10(3) UL (ref 0–0.2)
BASOPHILS NFR BLD AUTO: 0.4 %
BILIRUB SERPL-MCNC: 0.4 MG/DL (ref 0.1–2)
BUN BLD-MCNC: 41 MG/DL (ref 7–18)
CALCIUM BLD-MCNC: 9.1 MG/DL (ref 8.5–10.1)
CHLORIDE SERPL-SCNC: 108 MMOL/L (ref 98–112)
CHOLEST SERPL-MCNC: 182 MG/DL (ref ?–200)
CO2 SERPL-SCNC: 23 MMOL/L (ref 21–32)
CREAT BLD-MCNC: 2.47 MG/DL
EGFRCR SERPLBLD CKD-EPI 2021: 25 ML/MIN/1.73M2 (ref 60–?)
EOSINOPHIL # BLD AUTO: 0.17 X10(3) UL (ref 0–0.7)
EOSINOPHIL NFR BLD AUTO: 1.5 %
ERYTHROCYTE [DISTWIDTH] IN BLOOD BY AUTOMATED COUNT: 12.1 %
GLOBULIN PLAS-MCNC: 3.9 G/DL (ref 2.8–4.4)
GLUCOSE BLD-MCNC: 112 MG/DL (ref 70–99)
GLUCOSE BLD-MCNC: 160 MG/DL (ref 70–99)
GLUCOSE BLD-MCNC: 93 MG/DL (ref 70–99)
HCT VFR BLD AUTO: 41.2 %
HDLC SERPL-MCNC: 60 MG/DL (ref 40–59)
HGB BLD-MCNC: 14.3 G/DL
IMM GRANULOCYTES # BLD AUTO: 0.04 X10(3) UL (ref 0–1)
IMM GRANULOCYTES NFR BLD: 0.4 %
INR BLD: 1.16 (ref 0.85–1.16)
LDLC SERPL CALC-MCNC: 87 MG/DL (ref ?–100)
LYMPHOCYTES # BLD AUTO: 1.96 X10(3) UL (ref 1–4)
LYMPHOCYTES NFR BLD AUTO: 17.4 %
MCH RBC QN AUTO: 35 PG (ref 26–34)
MCHC RBC AUTO-ENTMCNC: 34.7 G/DL (ref 31–37)
MCV RBC AUTO: 101 FL
MONOCYTES # BLD AUTO: 1.01 X10(3) UL (ref 0.1–1)
MONOCYTES NFR BLD AUTO: 8.9 %
NEUTROPHILS # BLD AUTO: 8.06 X10 (3) UL (ref 1.5–7.7)
NEUTROPHILS # BLD AUTO: 8.06 X10(3) UL (ref 1.5–7.7)
NEUTROPHILS NFR BLD AUTO: 71.4 %
NONHDLC SERPL-MCNC: 122 MG/DL (ref ?–130)
OSMOLALITY SERPL CALC.SUM OF ELEC: 298 MOSM/KG (ref 275–295)
P AXIS: 96 DEGREES
P-R INTERVAL: 176 MS
PLATELET # BLD AUTO: 253 10(3)UL (ref 150–450)
POTASSIUM SERPL-SCNC: 4.1 MMOL/L (ref 3.5–5.1)
PROT SERPL-MCNC: 7.2 G/DL (ref 6.4–8.2)
PROTHROMBIN TIME: 14.9 SECONDS (ref 11.6–14.8)
Q-T INTERVAL: 418 MS
QRS DURATION: 126 MS
QTC CALCULATION (BEZET): 451 MS
R AXIS: -51 DEGREES
RBC # BLD AUTO: 4.08 X10(6)UL
SODIUM SERPL-SCNC: 139 MMOL/L (ref 136–145)
T AXIS: 45 DEGREES
TRIGL SERPL-MCNC: 211 MG/DL (ref 30–149)
VENTRICULAR RATE: 70 BPM
VLDLC SERPL CALC-MCNC: 34 MG/DL (ref 0–30)
WBC # BLD AUTO: 11.3 X10(3) UL (ref 4–11)

## 2023-10-12 PROCEDURE — 70450 CT HEAD/BRAIN W/O DYE: CPT | Performed by: EMERGENCY MEDICINE

## 2023-10-12 RX ORDER — LATANOPROST 50 UG/ML
1 SOLUTION/ DROPS OPHTHALMIC NIGHTLY
Status: DISCONTINUED | OUTPATIENT
Start: 2023-10-12 | End: 2023-10-14

## 2023-10-12 RX ORDER — DILTIAZEM HYDROCHLORIDE 120 MG/1
120 CAPSULE, EXTENDED RELEASE ORAL DAILY
Status: DISCONTINUED | OUTPATIENT
Start: 2023-10-13 | End: 2023-10-14

## 2023-10-12 RX ORDER — HYDRALAZINE HYDROCHLORIDE 25 MG/1
25 TABLET, FILM COATED ORAL 3 TIMES DAILY
COMMUNITY

## 2023-10-12 RX ORDER — LOSARTAN POTASSIUM 25 MG/1
50 TABLET ORAL 2 TIMES DAILY
COMMUNITY

## 2023-10-12 RX ORDER — PANTOPRAZOLE SODIUM 40 MG/1
40 TABLET, DELAYED RELEASE ORAL
Status: DISCONTINUED | OUTPATIENT
Start: 2023-10-13 | End: 2023-10-14

## 2023-10-12 RX ORDER — SODIUM CHLORIDE 9 MG/ML
INJECTION, SOLUTION INTRAVENOUS CONTINUOUS
Status: ACTIVE | OUTPATIENT
Start: 2023-10-12 | End: 2023-10-12

## 2023-10-12 RX ORDER — ASPIRIN 81 MG/1
81 TABLET, CHEWABLE ORAL ONCE
Status: COMPLETED | OUTPATIENT
Start: 2023-10-12 | End: 2023-10-12

## 2023-10-12 RX ORDER — HYDRALAZINE HYDROCHLORIDE 25 MG/1
25 TABLET, FILM COATED ORAL 3 TIMES DAILY
Status: DISCONTINUED | OUTPATIENT
Start: 2023-10-12 | End: 2023-10-13

## 2023-10-12 RX ORDER — FLUTICASONE FUROATE AND VILANTEROL 200; 25 UG/1; UG/1
1 POWDER RESPIRATORY (INHALATION) DAILY
Status: DISCONTINUED | OUTPATIENT
Start: 2023-10-13 | End: 2023-10-14

## 2023-10-12 RX ORDER — METOCLOPRAMIDE HYDROCHLORIDE 5 MG/ML
5 INJECTION INTRAMUSCULAR; INTRAVENOUS EVERY 8 HOURS PRN
Status: DISCONTINUED | OUTPATIENT
Start: 2023-10-12 | End: 2023-10-14

## 2023-10-12 RX ORDER — ACETAMINOPHEN 650 MG/1
650 SUPPOSITORY RECTAL EVERY 4 HOURS PRN
Status: DISCONTINUED | OUTPATIENT
Start: 2023-10-12 | End: 2023-10-14

## 2023-10-12 RX ORDER — METOPROLOL TARTRATE 50 MG/1
50 TABLET, FILM COATED ORAL
Status: DISCONTINUED | OUTPATIENT
Start: 2023-10-12 | End: 2023-10-14

## 2023-10-12 RX ORDER — SODIUM CHLORIDE 9 MG/ML
INJECTION, SOLUTION INTRAVENOUS CONTINUOUS
Status: DISCONTINUED | OUTPATIENT
Start: 2023-10-12 | End: 2023-10-13

## 2023-10-12 RX ORDER — ALBUTEROL SULFATE 90 UG/1
2 AEROSOL, METERED RESPIRATORY (INHALATION) EVERY 6 HOURS PRN
Status: DISCONTINUED | OUTPATIENT
Start: 2023-10-12 | End: 2023-10-14

## 2023-10-12 RX ORDER — ONDANSETRON 2 MG/ML
4 INJECTION INTRAMUSCULAR; INTRAVENOUS EVERY 6 HOURS PRN
Status: DISCONTINUED | OUTPATIENT
Start: 2023-10-12 | End: 2023-10-14

## 2023-10-12 RX ORDER — HYDRALAZINE HYDROCHLORIDE 20 MG/ML
10 INJECTION INTRAMUSCULAR; INTRAVENOUS EVERY 2 HOUR PRN
Status: DISCONTINUED | OUTPATIENT
Start: 2023-10-12 | End: 2023-10-14

## 2023-10-12 RX ORDER — ASPIRIN 81 MG/1
81 TABLET, CHEWABLE ORAL DAILY
Status: DISCONTINUED | OUTPATIENT
Start: 2023-10-12 | End: 2023-10-14

## 2023-10-12 RX ORDER — MELATONIN
325
Status: DISCONTINUED | OUTPATIENT
Start: 2023-10-13 | End: 2023-10-14

## 2023-10-12 RX ORDER — ONDANSETRON 2 MG/ML
4 INJECTION INTRAMUSCULAR; INTRAVENOUS EVERY 4 HOURS PRN
Status: DISCONTINUED | OUTPATIENT
Start: 2023-10-12 | End: 2023-10-12 | Stop reason: SDUPTHER

## 2023-10-12 RX ORDER — LABETALOL HYDROCHLORIDE 5 MG/ML
10 INJECTION, SOLUTION INTRAVENOUS EVERY 10 MIN PRN
Status: DISCONTINUED | OUTPATIENT
Start: 2023-10-12 | End: 2023-10-14

## 2023-10-12 RX ORDER — ACETAMINOPHEN 325 MG/1
650 TABLET ORAL EVERY 4 HOURS PRN
Status: DISCONTINUED | OUTPATIENT
Start: 2023-10-12 | End: 2023-10-14

## 2023-10-12 NOTE — ED QUICK NOTES
Orders for admission, patient is aware of plan and ready to go upstairs. Any questions, please call ED RN Liya at extension [phone].      Patient Covid vaccination status: Fully vaccinated     COVID Test Ordered in ED: None    COVID Suspicion at Admission: N/A    Running Infusions:  None    Mental Status/LOC at time of transport: A&Ox3    Other pertinent information:   CIWA score: N/A   NIH score:  0

## 2023-10-12 NOTE — SIGNIFICANT EVENT
Called to ED for Stroke Alert at 1546  Arrived to department at 1549  Patient located at [facility], Dr Damari Lindo present   Last Known Normal at 1510  Pre-morbid mRS 0    Initial NIHSS 2 including slurred speech and L sided facial numbness  Pt accompanied to CT dept  Dr Haile Case (Neuro Critical Care) notified at 1553  Repeat NIHSS completed back in ED room    NIH Stroke Scale  1a. Level of consciousness: 0   1b. LOC questions:  0   1c. LOC commands: 0   2. Best Gaze: 0   3. Visual: 0   4. Facial Palsy: 0   5a. Motor left arm: 0   5b. Motor right arm: 0   6a. Motor left le   6b. Motor right le   7. Limb Ataxia: 0   8. Sensory: 0   9. Best Language:  0   10. Dysarthria: 0   11. Extinction and Inattention: 0     Total:   0      Other symptoms include N/A    Dr Haile Case updated on patient's status, CT results and repeat NIHSS   Case discussed with Dr Angelika Underwood, ED; will order MRI (patient has a pacemaker and will require a clearance from [employer])    Of note: Patient was brought to ED for evaluation of stroke. Onset of slurred speech and L sided facial numbness was noted by patient at 1510. CT head/neck was completed, CTA was contraindicated. Patient returned back to baseline shortly after arrival to ED, repeat NIHSS 0 obtained. Please refer to detailed breakdown of NIHSS above.       Jamaica Carlson RN, BSN  Stroke Navigator  [phone]

## 2023-10-12 NOTE — CONSULTS
[facility] Pharmacy Note:  Renal Dose Adjustment for Metoclopramide (REGLAN)    Vicky Perez. has been prescribed Metoclopramide (REGLAN) 10 mg every 8 hours as needed for nausea/vomiting,. Estimated Creatinine Clearance: 22.7 mL/min (A) (based on SCr of 2.47 mg/dL (H)). Calculated creatinine clearance is < 40 ml/min, therefore, the dose of Metoclopramide (REGLAN) has been changed to 5 mg every 8 hours as needed for nausea/vomiting per P&T approved protocol. Pharmacy will continue to follow, and if renal function improves, will resume the original order.        Thank you,  Masood Last, PharmD  10/12/2023 6:50 PM

## 2023-10-13 ENCOUNTER — APPOINTMENT (OUTPATIENT)
Dept: CV DIAGNOSTICS | Facility: HOSPITAL | Age: 83
DRG: 066 | End: 2023-10-13
Attending: HOSPITALIST
Payer: MEDICARE

## 2023-10-13 ENCOUNTER — APPOINTMENT (OUTPATIENT)
Dept: ULTRASOUND IMAGING | Facility: HOSPITAL | Age: 83
End: 2023-10-13
Attending: HOSPITALIST
Payer: MEDICARE

## 2023-10-13 ENCOUNTER — APPOINTMENT (OUTPATIENT)
Dept: ULTRASOUND IMAGING | Facility: HOSPITAL | Age: 83
DRG: 066 | End: 2023-10-13
Attending: HOSPITALIST
Payer: MEDICARE

## 2023-10-13 ENCOUNTER — APPOINTMENT (OUTPATIENT)
Dept: CV DIAGNOSTICS | Facility: HOSPITAL | Age: 83
End: 2023-10-13
Attending: HOSPITALIST
Payer: MEDICARE

## 2023-10-13 LAB — GLUCOSE BLD-MCNC: 123 MG/DL (ref 70–99)

## 2023-10-13 PROCEDURE — 93880 EXTRACRANIAL BILAT STUDY: CPT | Performed by: HOSPITALIST

## 2023-10-13 PROCEDURE — 93306 TTE W/DOPPLER COMPLETE: CPT | Performed by: HOSPITALIST

## 2023-10-13 PROCEDURE — 99223 1ST HOSP IP/OBS HIGH 75: CPT | Performed by: OTHER

## 2023-10-13 RX ORDER — HYDRALAZINE HYDROCHLORIDE 50 MG/1
50 TABLET, FILM COATED ORAL ONCE
Status: COMPLETED | OUTPATIENT
Start: 2023-10-13 | End: 2023-10-13

## 2023-10-13 RX ORDER — ATORVASTATIN CALCIUM 20 MG/1
20 TABLET, FILM COATED ORAL NIGHTLY
Status: DISCONTINUED | OUTPATIENT
Start: 2023-10-13 | End: 2023-10-14

## 2023-10-13 RX ORDER — LOSARTAN POTASSIUM 50 MG/1
50 TABLET ORAL DAILY
Status: DISCONTINUED | OUTPATIENT
Start: 2023-10-13 | End: 2023-10-14

## 2023-10-13 NOTE — PHYSICAL THERAPY NOTE
PHYSICAL THERAPY EVALUATION - INPATIENT     Room Number: 1424/6049-N  Evaluation Date: 10/13/2023  Type of Evaluation: Initial  Physician Order: PT Eval and Treat    Presenting Problem: R arm numbness, slurred speech, L eye droop, TIA  Co-Morbidities : afib, HTN, GERD, COPD  Reason for Therapy: Mobility Dysfunction and Discharge Planning      Previous Admissions:   5/20-5/25/2023: Wheezing; not seen by therapy       ASSESSMENT   Pt is a 79year old male admitted on 10/12/2023 for R arm numbness, slurred speech, L eye droop, TIA. Functional outcome measures completed include [facility]. The AM-PAC '6-Clicks' Inpatient Basic Mobility Short Form was completed and this patient is demonstrating a Approx Degree of Impairment: 20.91%  degree of impairment in mobility. Research supports that patients with this level of impairment may benefit from home. PT Discharge Recommendations: Home      PLAN  Patient has been evaluated and presents with no skilled Physical Therapy needs at this time. Patient discharged from Physical Therapy services. Please re-order if a new functional limitation presents during this admission. GOALS  Patient was able to achieve the following goals . .. Patient was able to transfer At previous, functional level   Patient able to ambulate on level surfaces At previous, functional level         HOME SITUATION  Type of Home: [facility] [address]: One level  Stairs to Enter : 0             Lives With: Alone (Son local)  Drives: Yes  Patient Owned Equipment: Cane       Prior Level of [place]: Pt reports he is typically indep for ADLs and mobility. Has a cane/walking stick that he will use occasionally. Son reports that he has difficulty with curbs. Reports just finishing 4 weeks of OP PT for general strengthening. Is supposed to start pulmonary rehab soon 2x/week for 20 weeks.      SUBJECTIVE  \"That walking felt the same as it normally does\"       OBJECTIVE  Precautions: Bed/chair alarm  Fall Risk: Standard fall risk    WEIGHT BEARING RESTRICTION  Weight Bearing Restriction: None                PAIN ASSESSMENT  Ratin          COGNITION  Overall Cognitive Status:  Impaired    RANGE OF MOTION AND STRENGTH ASSESSMENT  See OT note for UE assessment    Lower extremity ROM is within functional limits    Lower extremity strength is within functional limits      BALANCE  Static Sitting: Fair  Dynamic Sitting: Fair  Static Standing: Fair  Dynamic Standing: Fair -    ADDITIONAL TESTS                                    ACTIVITY TOLERANCE                         O2 WALK  Oxygen Therapy  SPO2% on Room Air at Rest: 96    NEUROLOGICAL FINDINGS                        AM-PAC '6-Clicks' INPATIENT SHORT FORM - BASIC MOBILITY  How much difficulty does the patient currently have. .. Patient Difficulty: Turning over in bed (including adjusting bedclothes, sheets and blankets)?: None   Patient Difficulty: Sitting down on and standing up from a chair with arms (e.g., wheelchair, bedside commode, etc.): None   Patient Difficulty: Moving from lying on back to sitting on the side of the bed?: None   How much help from another person does the patient currently need. .. Help from Another: Moving to and from a bed to a chair (including a wheelchair)?: None   Help from Another: Need to walk in hospital room?: A Little   Help from Another: Climbing 3-5 steps with a railing?: A Little       AM-PAC Score:  Raw Score: 22   Approx Degree of Impairment: 20.91%   Standardized Score (AM-PAC Scale): 53.28   CMS Modifier (G-Code): CJ    FUNCTIONAL ABILITY STATUS  Gait Assessment   Functional Mobility/Gait Assessment  Gait Assistance: Supervision; Independent  Distance (ft): 150  Assistive Device: None  Pattern: Within Functional Limits (Occasional unsteadiness)    Skilled Therapy Provided     Gait Training:   Pt educated on energy conservation and pursed lip breathing during ambulation.      Therapeutic Activity:   Pt cued on placement of UE and LEs for optimal force generation and safe STS transfer. Pt cued on usage of arm rests for controlled descent into sitting      Bed Mobility:  Rolling: NT  Supine to sit: NT   Sit to supine: NT     Transfer Mobility:  Sit to stand: ind   Stand to sit: ind  Gait = ind with occasional supervision when dynamic balance is challenged - cervical rotation to participate in conversation    Therapist's comments: RN cleared for session. Pt agreeable for therapy, received up in bathroom. Son present for session. Pt audibly appeared SOB, however pt declined. Instructed to call for nursing staff for any needs and OOB mobility. Exercise/Education Provided:  Bed mobility  Body mechanics  Energy conservation  Functional activity tolerated  Gait training  Posture  Strengthening  Transfer training    Patient End of Session: Up in chair;Needs met;Call light within reach;RN aware of session/findings; All patient questions and concerns addressed; Alarm set    Patient Evaluation Complexity Level:  History Moderate - 1 or 2 personal factors and/or co-morbidities   Examination of body systems Low - addressing 1-2 elements   Clinical Presentation Low - Stable   Clinical Decision Making Low Complexity       PT Session Time: 25 minutes  Gait Trainin minutes  Therapeutic Activity: 2

## 2023-10-13 NOTE — PLAN OF CARE
Assumed care at approximately 1930. A & O x 4.   RA.   NSR/ ST on tele. Patient denies pain overnight. Neuros q 2 per protocol, no new deficits. See flowsheets. IVFs infusing per order. Neuro to see. PT/OT/ SLP to see. Awaiting ECHO and US carotids. Call light within reach. Patient updated on plan of care.

## 2023-10-13 NOTE — PLAN OF CARE
Stroke education given to patient; the following education was provided:     BEFAST - Stroke warning signs and symptoms  Personalized risk factors including Hypertension, hyperlipidemia, and afib  Need for follow-up after discharge  How to activate EMS for stroke  Healthy lifestyle (nutrition and exercise)    Patient present during education. Patient receptive to teachings. All pertinent questions and concerns were addressed.      Inez Young RN, BSN  Stroke Navigator  [phone]

## 2023-10-13 NOTE — CM/SW NOTE
10/13/23 1300   CM/SW Referral Data   Referral Source Social Work (self-referral)   Reason for Referral Protocol order set; Discharge planning     Pt admits on 10/12/2023 for R arm numbness, slurred speech, L eye droop, TIA. Symptoms resolved. CVA workup ordered. PT/OT recommends Home at DC. No needs anticipated at DC.      PARIS Stone  Discharge 2011 [facility]

## 2023-10-13 NOTE — SLP NOTE
ADULT SWALLOWING EVALUATION    ASSESSMENT    ASSESSMENT/OVERALL IMPRESSION:  Dulce Thomas is a(n) 79year old male with a PMH significant for HTN, COPD, stroke,  Afib, CKD-III, frequent falls who came to the  ED for L facial numbness and slurred speech. Per chart, patient started to have mild numbness and tingling on the medial aspect of his R forearm. All symptoms resolved in about 20 minutes and were gone by arrival to the ED. Orders were received for a bedside swallowing evaluation per stroke protocol. Oral motor mechanism exam revealed functional oral range, rate, and strength of oral musculature, intact dentition, and clear vocal quality. Strong cough on command. Assessed patient with thin liquids via spoon, cup, and straw, puree, and solids. Oral phase revealed functional oral acceptance, retrieval and mastication of solids with timely and complete clearing of the oral cavity. Pharyngeal phase revealed an apparent timely initiation of the pharyngeal phase with functional hyolaryngeal elevation on palpation. No coughing or throat clearing. Patient presents with functional oral phase and apparent functional pharyngeal phase of swallow free of overt clinical s/s of aspiration. Recommend regular solids and thin liquids. Patient with fluent speech expressing needs and ideas without evidence of motor speech deficits. Patient understanding conversational speech. No further skilled speech/swallowing service needed. If any change in status, please re-consult. Eason Assessment of Swallow Function Score: No abnormality detected    RECOMMENDATIONS   Diet Recommendations - Solids: Regular  Diet Recommendations - Liquids:  Thin Liquids       Medication Administration Recommendations: No restrictions  Treatment Plan/Recommendations: No further inpatient SLP service warranted  Discharge Recommendations/Plan: Home    HISTORY   MEDICAL HISTORY  Reason for Referral: Stroke protocol    Problem List  Principal Problem:    Acute CVA [facility]ce[facility]brovascular accident) [facility])      Past Medical History  Past Medical History:   Diagnosis Date    Gonzalez esophagus     Benign essential HTN     Cancer ([address]     prostate    Cataract     COPD (chronic obstructive pulmonary disease) (HCC)     Glaucoma     High blood pressure     High cholesterol     History of blood transfusion     History of COVID-19     History of prostate cancer     History of stomach ulcers     Osteoarthritis     Renal disorder     Visual impairment        Prior Living Situation: Home alone  Diet Prior to Admission: Regular; Thin liquids  Precautions: None    Patient/Family Goals: To eat and drink    SWALLOWING HISTORY  Current Diet Consistency: Regular; Thin liquids  Dysphagia History: No past history  Imaging Results:     CXR CONCLUSION:  Normal heart size and pulmonary vascularity. Pacemaker leads in RA and RV. Slight coarse reticular interstitial markings in the lung bases. No focal consolidation or effusion. CT of Brain CONCLUSION:    1. No acute intracranial hemorrhage or hydrocephalus. 2. Mild to moderate age indeterminate small vessel ischemic disease. Punctate chronic appearing infarct at the left caudate. Punctate chronic appearing infarct at the left cerebellum. If there is clinical concern for acute ischemia/infarction, an MRI   of the brain would be recommended for further evaluation. SUBJECTIVE       OBJECTIVE   ORAL MOTOR EXAMINATION  Dentition: Natural;Functional  Symmetry: Within Functional Limits  Strength: Within Functional Limits  Tone: Within Functional Limits  Range of Motion: Within Functional Limits  Rate of Motion: Within Functional Limits    Voice Quality: Clear  Respiratory Status: Unlabored  Consistencies Trialed: Thin liquids;Puree;Hard solid  Method of Presentation: Self presentation;Spoon;Cup;Straw;Consecutive swallows  Patient Positioning: Upright;Midline (in chair)    Oral Phase of Swallow:  Within Functional Limits Pharyngeal Phase of Swallow: Within Functional Limits     (Please note: Silent aspiration cannot be evaluated clinically.  Videofluoroscopic Swallow Study is required to rule-out silent aspiration.)    Esophageal Phase of Swallow: No complaints consistent with possible esophageal involvement  Comments:                 FOLLOW UP  Treatment Plan/Recommendations: No further inpatient SLP service warranted     Follow Up Needed (Documentation Required): No       Thank you for your referral.   If you have any questions, please contact Luiza Reyez, SLP

## 2023-10-13 NOTE — PLAN OF CARE
Assumed care @ 0730. Alert and orientated X4. Neuro's Q4, no acute changes. See flowsheets. NST on tele, RA, VSS. Patient denies pain. Echo completed. US of carotids completed. Vascular consulted. Bed alarm on, call light within reach.

## 2023-10-14 VITALS
HEART RATE: 60 BPM | DIASTOLIC BLOOD PRESSURE: 70 MMHG | BODY MASS INDEX: 23 KG/M2 | WEIGHT: 158.94 LBS | SYSTOLIC BLOOD PRESSURE: 150 MMHG | RESPIRATION RATE: 24 BRPM | TEMPERATURE: 98 F | OXYGEN SATURATION: 97 %

## 2023-10-14 LAB
ANION GAP SERPL CALC-SCNC: 2 MMOL/L (ref 0–18)
BASOPHILS # BLD AUTO: 0.05 X10(3) UL (ref 0–0.2)
BASOPHILS NFR BLD AUTO: 0.7 %
BUN BLD-MCNC: 30 MG/DL (ref 7–18)
CALCIUM BLD-MCNC: 9.1 MG/DL (ref 8.5–10.1)
CHLORIDE SERPL-SCNC: 109 MMOL/L (ref 98–112)
CO2 SERPL-SCNC: 26 MMOL/L (ref 21–32)
CREAT BLD-MCNC: 2.08 MG/DL
EGFRCR SERPLBLD CKD-EPI 2021: 31 ML/MIN/1.73M2 (ref 60–?)
EOSINOPHIL # BLD AUTO: 0.25 X10(3) UL (ref 0–0.7)
EOSINOPHIL NFR BLD AUTO: 3.4 %
ERYTHROCYTE [DISTWIDTH] IN BLOOD BY AUTOMATED COUNT: 11.9 %
GLUCOSE BLD-MCNC: 103 MG/DL (ref 70–99)
GLUCOSE BLD-MCNC: 126 MG/DL (ref 70–99)
GLUCOSE BLD-MCNC: 127 MG/DL (ref 70–99)
HCT VFR BLD AUTO: 37.7 %
HGB BLD-MCNC: 12.8 G/DL
HGBA1C: 5.6 %
IMM GRANULOCYTES # BLD AUTO: 0.02 X10(3) UL (ref 0–1)
IMM GRANULOCYTES NFR BLD: 0.3 %
LYMPHOCYTES # BLD AUTO: 1.19 X10(3) UL (ref 1–4)
LYMPHOCYTES NFR BLD AUTO: 16.1 %
MAGNESIUM SERPL-MCNC: 2 MG/DL (ref 1.6–2.6)
MCH RBC QN AUTO: 34.5 PG (ref 26–34)
MCHC RBC AUTO-ENTMCNC: 34 G/DL (ref 31–37)
MCV RBC AUTO: 101.6 FL
MONOCYTES # BLD AUTO: 0.75 X10(3) UL (ref 0.1–1)
MONOCYTES NFR BLD AUTO: 10.1 %
NEUTROPHILS # BLD AUTO: 5.13 X10 (3) UL (ref 1.5–7.7)
NEUTROPHILS # BLD AUTO: 5.13 X10(3) UL (ref 1.5–7.7)
NEUTROPHILS NFR BLD AUTO: 69.4 %
OSMOLALITY SERPL CALC.SUM OF ELEC: 290 MOSM/KG (ref 275–295)
PLATELET # BLD AUTO: 223 10(3)UL (ref 150–450)
POTASSIUM SERPL-SCNC: 4.5 MMOL/L (ref 3.5–5.1)
RBC # BLD AUTO: 3.71 X10(6)UL
SODIUM SERPL-SCNC: 137 MMOL/L (ref 136–145)
WBC # BLD AUTO: 7.4 X10(3) UL (ref 4–11)

## 2023-10-14 PROCEDURE — 99232 SBSQ HOSP IP/OBS MODERATE 35: CPT | Performed by: OTHER

## 2023-10-14 RX ORDER — ATORVASTATIN CALCIUM 20 MG/1
20 TABLET, FILM COATED ORAL NIGHTLY
Qty: 30 TABLET | Refills: 0 | Status: SHIPPED | OUTPATIENT
Start: 2023-10-14 | End: 2023-11-13

## 2023-10-14 RX ORDER — METOPROLOL TARTRATE 50 MG/1
50 TABLET, FILM COATED ORAL
Status: SHIPPED | COMMUNITY
Start: 2023-10-14

## 2023-10-14 RX ORDER — ASPIRIN 81 MG/1
81 TABLET, CHEWABLE ORAL DAILY
Qty: 30 TABLET | Refills: 0 | Status: SHIPPED | OUTPATIENT
Start: 2023-10-15 | End: 2023-11-14

## 2023-10-14 RX ORDER — DILTIAZEM HYDROCHLORIDE 120 MG/1
120 CAPSULE, COATED, EXTENDED RELEASE ORAL DAILY
Status: SHIPPED | COMMUNITY
Start: 2023-10-14

## 2023-10-14 NOTE — PLAN OF CARE
Assumed patient care 0730  Patient alert and oriented X4  On room air, BP elevated, given scheduled BP medications, MDs notified, Apaced on tele  Denies pain  Q4 neuro, no new deficits  Patient is up with standby assist to bathroom, voiding, no BM this shift  Tolerating diet  Patient discussed plan to decline further treatment with MDs, may follow up outpatient   Patient cleared by all consultants for discharge         NURSING DISCHARGE NOTE    All discharge instructions, AVS, follow ups, and medications reviewed with patient and son at bedside, verbalized understanding. Signs and symptoms when to call 911 discussed with patient and son at bedside, verbalized understanding. Discharged Home via Wheelchair. Accompanied by Family member and RN  Belongings Taken by patient/family. Problem: NEUROLOGICAL - ADULT  Goal: Achieves stable or improved neurological status  Description: INTERVENTIONS  - Assess for and report changes in neurological status  - Initiate measures to prevent increased intracranial pressure  - Maintain blood pressure and fluid volume within ordered parameters to optimize cerebral perfusion and minimize risk of hemorrhage  - Monitor temperature, glucose, and sodium. Initiate appropriate interventions as ordered  Outcome: Progressing  Goal: Achieves maximal functionality and self care  Description: INTERVENTIONS  - Monitor swallowing and airway patency with patient fatigue and changes in neurological status  - Encourage and assist patient to increase activity and self care with guidance from PT/OT  - Encourage visually impaired, hearing impaired and aphasic patients to use assistive/communication devices  Outcome: Progressing     Problem: SAFETY ADULT - FALL  Goal: Free from fall injury  Description: INTERVENTIONS:  - Assess pt frequently for physical needs  - Identify cognitive and physical deficits and behaviors that affect risk of falls.   - [facility] fall precautions as indicated by assessment.  - Educate pt/family on patient safety including physical limitations  - Instruct pt to call for assistance with activity based on assessment  - Modify environment to reduce risk of injury  - Provide assistive devices as appropriate  - Consider OT/PT consult to assist with strengthening/mobility  - Encourage toileting schedule  Outcome: Progressing

## 2023-10-14 NOTE — DISCHARGE SUMMARY
General Medicine Discharge Summary     Patient ID:  Vicky Perez.  79year old  2/12/1940    Admit date: 10/12/2023    Discharge date and time: 10/14/2023    Attending Physician: Mohan Daniel DO     Consults: IP CONSULT TO HOSPITALIST  IP CONSULT TO NEUROLOGY  IP CONSULT TO SOCIAL WORK  IP CONSULT TO VASCULAR SURGERY    Primary Care Physician: William Wang MD     Reason for admission: TIA    Risk For Readmission: Low    Discharge Diagnoses: Acute CVA (cerebrovascular accident) [facility]) [I63.9]  See Additional Discharge Diagnoses in Hospital Course    Discharged Condition: stable    Follow-up with labs/images appointments: Follow-up with vascular surgery and neurology    Exam  Gen: No acute distress  Pulm: Lungs clear, normal respiratory effort  CV: Heart with regular rate and rhythm  Abd: Abdomen soft,       HPI: Per chart    Hospital Course:   79year old male with history of christianson esophagus, htn, prostate cancer, copd, htn, hld, stomach ulcers, osteoarthritis, bladder cancer and atrial fibrillation presenting with right arm numbness, slurred speech, left lip numbness and left eye droop. Patient was managed for TIA. His symptoms resolved. He was seen by neurology and stroke work-up was initiated. Patient's carotid Dopplers were abnormal hence vascular surgery was consulted. Given patient's age and CKD, patient refused any further imaging that would require contrast.  Patient is recommended to follow-up with vascular surgery in the outpatient setting. Aspirin and statin were added to his regimen. Patient also had MRIs ordered to further evaluate his TIA//CVA lesion. However patient elected to forego this imaging as it would not change any management and he elected to be discharged. This was discussed with neurology and patient was cleared for discharge with outpatient follow-up. Details below.      TIA  -pt sx resolved  -CT brain shows no acute pathology  -pt with contrast allergy and PM so unable to do ct with contrast or MRI currently  -check carotid dopplers pending  -neuro consulted, defer to neuro on need to MRI and CT with contrast. If so can discuss PM compatibility for MRI and contrast pre treatment for cta --> MRI/MRA brain-patient deferred MRIs. -neuro checks  -monitor on tele  -pt with hx atrial fibrillation and on ac already  -asa added     Atrial fibrillation  -eiquis  -metoprolol  -diltaizem     HTN  -sbp stable  -diltaizem  -metoprolol   -hydralazine  -losartan     GERD  Hx stomach ulcers  -pantoprazole     COPD  -Incruse  -Breo        Operative Procedures:      Imaging: US CAROTID DOPPLER BILAT - DIAG IMG (KMZ=[id number])    Result Date: 10/13/2023  CONCLUSION:  1. 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 2. There is a stenosis at the proximal left ICA measuring between 50 and 69%. Critical results were discussed with Karey BINGHAM at 1258 hours on 10/13/2023. Critical results were read back. LOCATION:  [id number]     Dictated by (CST): Crys Shultz MD on 10/13/2023 at 12:54 PM     Finalized by (CST): Crys Shultz MD on 10/13/2023 at 12:59 PM       CT STROKE BRAIN (NO IV)(CPT=70450)    Result Date: 10/12/2023  CONCLUSION:  1. No acute intracranial hemorrhage or hydrocephalus. 2. Mild to moderate age indeterminate small vessel ischemic disease. Punctate chronic appearing infarct at the left caudate. Punctate chronic appearing infarct at the left cerebellum. If there is clinical concern for acute ischemia/infarction, an MRI of the brain would be recommended for further evaluation. Critical results were discussed with Dr. João Escalante at 4 1500 hours on 10/12/2023. Critical results were read back.    LOCATION:  THE [facility]   Dictated by (CST): Crys Shultz MD on 10/12/2023 at 3:59 PM     Finalized by (CST): Crys Shultz MD on 10/12/2023 at 4:03 PM          Disposition: home    Activity: activity as tolerated  Diet: cardiac diet  Wound Care: none needed  Code Status: DNAR/Selective Treatment  O2: None    Home Medication Changes:     Med list     Medication List        START taking these medications      aspirin 81 MG Chew  Chew 1 tablet (81 mg total) by mouth daily. Start taking on: October 15, 2023     atorvastatin 20 MG Tabs  Commonly known as: Lipitor  Take 1 tablet (20 mg total) by mouth nightly. CONTINUE taking these medications      albuterol 108 (90 Base) MCG/ACT Aers  Commonly known as: Ventolin HFA     Budesonide-Formoterol Fumarate 160-4.5 MCG/ACT Aero  Commonly known as: SYMBICORT     CALCIUM OR     Cardizem  MG Cp24  Generic drug: dilTIAZem ER     Eliquis 2.5 MG Tabs  Generic drug: apixaban  TAKE 1 TABLET BY MOUTH TWICE A DAY     ferrous sulfate 325 (65 FE) MG Tbec  Take 1 tablet (325 mg total) by mouth daily with breakfast.     hydrALAZINE 25 MG Tabs  Commonly known as: Apresoline     latanoprost 0.005 % Soln  Commonly known as: Xalatan     losartan 25 MG Tabs  Commonly known as: Cozaar     metoprolol tartrate 50 MG Tabs  Commonly known as: Lopressor     MULTIVITAMIN ADULTS OR     omeprazole 20 MG Cpdr  Commonly known as: PriLOSEC     Spiriva Respimat 2.5 MCG/ACT Aers  Generic drug: Tiotropium Bromide Monohydrate  Inhale 2 puffs into the lungs daily. VITAMIN C OR               Where to Get Your Medications        These medications were sent to [address] [place] [address]. [phone], [facility]. [place] [address]      Phone: [phone]   aspirin 81 MG Chew  atorvastatin 20 MG Tabs            Follow-up Information       Alan Christensen MD Follow up in 1 month(s). Specialty: SURGERY, VASCULAR  Contact information:  [address] MD Follow up.     Specialty: Internal Medicine  Why: As needed  Contact information:  [address] [facility] Gilson Ortega DO Follow up in 4 week(s). Specialty: NEUROLOGY  Contact information:  [address]  [address] [facility] Alfred Hong[address] Yari Nunn 139                             DC instructions: Other Discharge Instructions:         Recommend aspirin 81 mg daily  Continue eliquis        Discharge medications reviewed and reconciled.     Total Time Coordinating Care: Greater than 30 minutes    Patient had opportunity to ask questions and state understand and agree with therapeutic plan as outlined    Thank You,    Angela Covington, [facility] Hospitalist  Pager

## 2023-10-14 NOTE — PLAN OF CARE
Assumed care at approximately 1930. A & O x 4.   RA. A paced on tele. Patient denies pain overnight. Neuros q 4 per protocol, no new deficits. See flowsheets. Awaiting MRI. Call light within reach. Patient updated on plan of care.

## 2023-10-15 NOTE — DISCHARGE PLANNING
Attn: CENTRAL SCHEDULING DEPT.     Patient follow-up appointment information:    Visit Type: Stroke follow-up  Date of Stroke: 10/12/2023  Type of Stroke: Ischemic vs TIA  Patient to follow-up: 4 weeks  OP Neurologist: Any available neurologist  Anticipated discharge (if known): 10/14/203  Discharge disposition (if known): Home      Jennifer Gonzalez RN, BSN  Stroke Navigator  [phone]

## 2023-10-17 ENCOUNTER — CARDPULM VISIT (OUTPATIENT)
Dept: CARDIAC REHAB | Facility: HOSPITAL | Age: 83
End: 2023-10-17
Attending: INTERNAL MEDICINE
Payer: MEDICARE

## 2023-10-17 ENCOUNTER — PATIENT OUTREACH (OUTPATIENT)
Dept: CASE MANAGEMENT | Age: 83
End: 2023-10-17

## 2023-10-17 PROCEDURE — 94625 PHY/QHP OP PULM RHB W/O MNTR: CPT

## 2023-10-17 NOTE — PROGRESS NOTES
Hospital follow up. Stroke follow up, 4 weeks. No answer, left a voicemail with callback information.

## 2023-10-19 ENCOUNTER — CARDPULM VISIT (OUTPATIENT)
Dept: CARDIAC REHAB | Facility: HOSPITAL | Age: 83
End: 2023-10-19
Attending: INTERNAL MEDICINE
Payer: MEDICARE

## 2023-10-19 PROCEDURE — 94625 PHY/QHP OP PULM RHB W/O MNTR: CPT

## 2023-10-19 NOTE — CDS QUERY
Michael Lombardo  Dear Dr. Venkatesh Thakur,  Documentation in the medical record indicates that this patient has been admitted or diagnosed with the symptoms of TIA (transient ischemic attack). Based on your clinical judgement and the below clinical indicators, can you further clarify the most likely or suspected underlying cause of the TIA symptoms:                                   [  x  ] CVA (cerebral vascular accident)  [    ] TIA due to occlusion or stenosis of carotid artery  [    ] TIA due to other   [    ] Other please comment:_________                 Documentation from the Medical Record:     RISKS: htn, hld, PPM    CLINICAL INDICATORS: 10/12 ED 83 Y/M-called to ED for stroke alert. initial NIHSS 2 including slurred speech and L sided facial numbness. Patient was brought to ED for evaluation of stroke. Onset of slurred speech and L sided facial numbness was noted by patient at 1510. CT head/neck was completed, CTA was contraindicated. Patient returned back to baseline shortly after arrival to ED, repeat NIHSS 0 obtained. -CT brain- Mild to moderate age indeterminate small vessel ischemic disease. Punctate chronic appearing infarct at the left caudate. Punctate chronic appearing infarct at the left cerebellum. 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 2. There is a stenosis at the proximal left ICA measuring between 50 and 69%. -10/14 vascular surgery- Transient ischemic attack-patient is on Eliquis and recommend starting baby aspirin. I reviewed his carotid ultrasound. The right 100% occlusion is managed medically with past medical therapy. The left moderate stenosis may be a false elevation due to the contralateral occlusion. We discussed further investigation with an MRI or CT angiogram, understanding the risks and benefits of CT angiogram in the context of his chronic kidney disease.   I think it is reasonable to not pursue those tests as it is unlikely that I would intervene on the left side and would only recommend best medical therapy.    -10/14 neurology PN- 80 Y/O male with multiple vascular risk factors and previous small L caudate stroke presenting with acute onset R forearm numbness,  L lip and tongue numbness and slurred speech. His exam is normal today. He can continue Eliquis at this time. Carotid US is pending. I will order an MRI but he has a pacemaker so this may not be done until next week. His crossed symptoms may indicate a small cerebellum or brainstem infarct or given gh/o Afib small multivascular territory emboli. CTH was reviewed an no acute lesions seen. CUS was done and he has a R ICA occlusion, and 50-69% stenosis in the L ICA. He tells me today that he is debating on  declining all further work up. He is on maximum medical management and he states that he is not interested in getting nay further carotid evaluation with CTA nor would he consider surgery on his carotid arteries. He also states that he is going to speak with his son about declining any further evaluation with MRI or MRA. If his decision is to decline all further testing then he is ok for discharge from neurology. BP goals 110-170SBP and ok to normalize. Stroke vs TIA  - MRI/MRA pending. Patient has pacer so will not be done until Monday or Tuesday - Vascular surgery on consult for L carotid stenosis - Continue apixaban and aspirin and statin - Pt reports that he is considering leaving with no further neurologic work up.  Ok per neurology      Treatment: vascular surgery consult, CT stroke brain, neurology consult, carotid US, tele, neuro checks, asa, eliquis        For questions regarding this query, please contact Clinical : Melissa Cruz RN Ext [phone]                                                                THIS FORM IS A PERMANENT PART OF THE MEDICAL RECORD

## 2023-10-24 ENCOUNTER — CARDPULM VISIT (OUTPATIENT)
Dept: CARDIAC REHAB | Facility: HOSPITAL | Age: 83
End: 2023-10-24
Attending: INTERNAL MEDICINE
Payer: MEDICARE

## 2023-10-24 PROCEDURE — 94625 PHY/QHP OP PULM RHB W/O MNTR: CPT

## 2023-10-26 ENCOUNTER — APPOINTMENT (OUTPATIENT)
Dept: CARDIAC REHAB | Facility: HOSPITAL | Age: 83
End: 2023-10-26
Attending: INTERNAL MEDICINE
Payer: MEDICARE

## 2023-10-26 ENCOUNTER — ORDER TRANSCRIPTION (OUTPATIENT)
Dept: CARDIAC REHAB | Facility: HOSPITAL | Age: 83
End: 2023-10-26

## 2023-10-26 DIAGNOSIS — J44.9 COPD (CHRONIC OBSTRUCTIVE PULMONARY DISEASE) (HCC): Primary | ICD-10-CM

## 2023-10-31 ENCOUNTER — CARDPULM VISIT (OUTPATIENT)
Dept: CARDIAC REHAB | Facility: HOSPITAL | Age: 83
End: 2023-10-31
Attending: INTERNAL MEDICINE
Payer: MEDICARE

## 2023-10-31 PROCEDURE — 94625 PHY/QHP OP PULM RHB W/O MNTR: CPT

## 2023-11-02 ENCOUNTER — CARDPULM VISIT (OUTPATIENT)
Dept: CARDIAC REHAB | Facility: HOSPITAL | Age: 83
End: 2023-11-02
Attending: INTERNAL MEDICINE
Payer: MEDICARE

## 2023-11-02 PROCEDURE — 94625 PHY/QHP OP PULM RHB W/O MNTR: CPT

## 2023-11-07 ENCOUNTER — CARDPULM VISIT (OUTPATIENT)
Dept: CARDIAC REHAB | Facility: HOSPITAL | Age: 83
End: 2023-11-07
Attending: INTERNAL MEDICINE
Payer: MEDICARE

## 2023-11-07 PROCEDURE — 94625 PHY/QHP OP PULM RHB W/O MNTR: CPT

## 2023-11-09 ENCOUNTER — CARDPULM VISIT (OUTPATIENT)
Dept: CARDIAC REHAB | Facility: HOSPITAL | Age: 83
End: 2023-11-09
Attending: INTERNAL MEDICINE
Payer: MEDICARE

## 2023-11-09 PROCEDURE — 94625 PHY/QHP OP PULM RHB W/O MNTR: CPT

## 2023-11-14 ENCOUNTER — CARDPULM VISIT (OUTPATIENT)
Dept: CARDIAC REHAB | Facility: HOSPITAL | Age: 83
End: 2023-11-14
Attending: INTERNAL MEDICINE
Payer: MEDICARE

## 2023-11-14 PROCEDURE — 94625 PHY/QHP OP PULM RHB W/O MNTR: CPT

## 2023-11-16 ENCOUNTER — CARDPULM VISIT (OUTPATIENT)
Dept: CARDIAC REHAB | Facility: HOSPITAL | Age: 83
End: 2023-11-16
Attending: INTERNAL MEDICINE
Payer: MEDICARE

## 2023-11-16 PROCEDURE — 94625 PHY/QHP OP PULM RHB W/O MNTR: CPT

## 2023-11-21 ENCOUNTER — CARDPULM VISIT (OUTPATIENT)
Dept: CARDIAC REHAB | Facility: HOSPITAL | Age: 83
End: 2023-11-21
Attending: INTERNAL MEDICINE
Payer: MEDICARE

## 2023-11-21 PROCEDURE — 94625 PHY/QHP OP PULM RHB W/O MNTR: CPT

## 2023-11-23 ENCOUNTER — APPOINTMENT (OUTPATIENT)
Dept: CARDIAC REHAB | Facility: HOSPITAL | Age: 83
End: 2023-11-23
Attending: INTERNAL MEDICINE
Payer: MEDICARE

## 2023-11-28 ENCOUNTER — CARDPULM VISIT (OUTPATIENT)
Dept: CARDIAC REHAB | Facility: HOSPITAL | Age: 83
End: 2023-11-28
Attending: INTERNAL MEDICINE
Payer: MEDICARE

## 2023-11-28 PROCEDURE — 94625 PHY/QHP OP PULM RHB W/O MNTR: CPT

## 2023-11-30 ENCOUNTER — CARDPULM VISIT (OUTPATIENT)
Dept: CARDIAC REHAB | Facility: HOSPITAL | Age: 83
End: 2023-11-30
Attending: INTERNAL MEDICINE
Payer: MEDICARE

## 2023-11-30 PROCEDURE — 94625 PHY/QHP OP PULM RHB W/O MNTR: CPT

## 2023-12-05 ENCOUNTER — CARDPULM VISIT (OUTPATIENT)
Dept: CARDIAC REHAB | Facility: HOSPITAL | Age: 83
End: 2023-12-05
Attending: INTERNAL MEDICINE
Payer: MEDICARE

## 2023-12-05 PROCEDURE — 94625 PHY/QHP OP PULM RHB W/O MNTR: CPT

## 2023-12-07 ENCOUNTER — CARDPULM VISIT (OUTPATIENT)
Dept: CARDIAC REHAB | Facility: HOSPITAL | Age: 83
End: 2023-12-07
Attending: INTERNAL MEDICINE
Payer: MEDICARE

## 2023-12-07 PROCEDURE — 94625 PHY/QHP OP PULM RHB W/O MNTR: CPT

## 2023-12-12 ENCOUNTER — CARDPULM VISIT (OUTPATIENT)
Dept: CARDIAC REHAB | Facility: HOSPITAL | Age: 83
End: 2023-12-12
Attending: INTERNAL MEDICINE
Payer: MEDICARE

## 2023-12-12 PROCEDURE — 94625 PHY/QHP OP PULM RHB W/O MNTR: CPT

## 2023-12-14 ENCOUNTER — CARDPULM VISIT (OUTPATIENT)
Dept: CARDIAC REHAB | Facility: HOSPITAL | Age: 83
End: 2023-12-14
Attending: INTERNAL MEDICINE
Payer: MEDICARE

## 2023-12-14 PROCEDURE — 94625 PHY/QHP OP PULM RHB W/O MNTR: CPT

## 2023-12-19 ENCOUNTER — CARDPULM VISIT (OUTPATIENT)
Dept: CARDIAC REHAB | Facility: HOSPITAL | Age: 83
End: 2023-12-19
Attending: INTERNAL MEDICINE
Payer: MEDICARE

## 2023-12-19 PROCEDURE — 94625 PHY/QHP OP PULM RHB W/O MNTR: CPT

## 2023-12-21 ENCOUNTER — CARDPULM VISIT (OUTPATIENT)
Dept: CARDIAC REHAB | Facility: HOSPITAL | Age: 83
End: 2023-12-21
Attending: INTERNAL MEDICINE
Payer: MEDICARE

## 2023-12-21 PROCEDURE — 94625 PHY/QHP OP PULM RHB W/O MNTR: CPT

## 2023-12-26 ENCOUNTER — APPOINTMENT (OUTPATIENT)
Dept: CARDIAC REHAB | Facility: HOSPITAL | Age: 83
End: 2023-12-26
Attending: INTERNAL MEDICINE
Payer: MEDICARE

## 2023-12-28 ENCOUNTER — CARDPULM VISIT (OUTPATIENT)
Dept: CARDIAC REHAB | Facility: HOSPITAL | Age: 83
End: 2023-12-28
Attending: INTERNAL MEDICINE
Payer: MEDICARE

## 2023-12-28 PROCEDURE — 94625 PHY/QHP OP PULM RHB W/O MNTR: CPT

## 2024-01-02 ENCOUNTER — CARDPULM VISIT (OUTPATIENT)
Dept: CARDIAC REHAB | Facility: HOSPITAL | Age: 84
End: 2024-01-02
Attending: INTERNAL MEDICINE
Payer: MEDICARE

## 2024-01-02 PROCEDURE — 94625 PHY/QHP OP PULM RHB W/O MNTR: CPT

## 2024-01-04 ENCOUNTER — CARDPULM VISIT (OUTPATIENT)
Dept: CARDIAC REHAB | Facility: HOSPITAL | Age: 84
End: 2024-01-04
Attending: INTERNAL MEDICINE
Payer: MEDICARE

## 2024-01-04 PROCEDURE — 94625 PHY/QHP OP PULM RHB W/O MNTR: CPT

## 2024-01-09 ENCOUNTER — CARDPULM VISIT (OUTPATIENT)
Dept: CARDIAC REHAB | Facility: HOSPITAL | Age: 84
End: 2024-01-09
Attending: INTERNAL MEDICINE
Payer: MEDICARE

## 2024-01-09 PROCEDURE — 94625 PHY/QHP OP PULM RHB W/O MNTR: CPT

## 2024-01-11 ENCOUNTER — CARDPULM VISIT (OUTPATIENT)
Dept: CARDIAC REHAB | Facility: HOSPITAL | Age: 84
End: 2024-01-11
Attending: INTERNAL MEDICINE
Payer: MEDICARE

## 2024-01-11 PROCEDURE — 94625 PHY/QHP OP PULM RHB W/O MNTR: CPT

## 2024-01-16 ENCOUNTER — CARDPULM VISIT (OUTPATIENT)
Dept: CARDIAC REHAB | Facility: HOSPITAL | Age: 84
End: 2024-01-16
Attending: INTERNAL MEDICINE
Payer: MEDICARE

## 2024-01-16 PROCEDURE — 94625 PHY/QHP OP PULM RHB W/O MNTR: CPT

## 2024-01-18 ENCOUNTER — APPOINTMENT (OUTPATIENT)
Dept: CARDIAC REHAB | Facility: HOSPITAL | Age: 84
End: 2024-01-18
Attending: INTERNAL MEDICINE
Payer: MEDICARE

## 2024-01-23 ENCOUNTER — APPOINTMENT (OUTPATIENT)
Dept: CARDIAC REHAB | Facility: HOSPITAL | Age: 84
End: 2024-01-23
Attending: INTERNAL MEDICINE
Payer: MEDICARE

## 2024-01-25 ENCOUNTER — APPOINTMENT (OUTPATIENT)
Dept: CARDIAC REHAB | Facility: HOSPITAL | Age: 84
End: 2024-01-25
Attending: INTERNAL MEDICINE
Payer: MEDICARE

## 2024-09-04 ENCOUNTER — HOSPITAL ENCOUNTER (EMERGENCY)
Age: 84
Discharge: HOME | End: 2024-09-04
Attending: EMERGENCY MEDICINE

## 2024-09-04 ENCOUNTER — APPOINTMENT (OUTPATIENT)
Dept: CT IMAGING | Age: 84
End: 2024-09-04
Attending: EMERGENCY MEDICINE

## 2024-09-04 VITALS
TEMPERATURE: 97.2 F | DIASTOLIC BLOOD PRESSURE: 74 MMHG | HEART RATE: 63 BPM | SYSTOLIC BLOOD PRESSURE: 157 MMHG | RESPIRATION RATE: 16 BRPM | OXYGEN SATURATION: 98 %

## 2024-09-04 DIAGNOSIS — S09.90XA TRAUMATIC INJURY OF HEAD, INITIAL ENCOUNTER: ICD-10-CM
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
## 2024-09-04 LAB — GLUCOSE BLDC GLUCOMTR-MCNC: 120 MG/DL (ref 70–99)

## 2024-09-04 PROCEDURE — 10002800 HB RX 250 W HCPCS: Performed by: EMERGENCY MEDICINE

## 2024-09-04 PROCEDURE — 72125 CT NECK SPINE W/O DYE: CPT

## 2024-09-04 PROCEDURE — 90471 IMMUNIZATION ADMIN: CPT | Performed by: EMERGENCY MEDICINE

## 2024-09-04 PROCEDURE — 90715 TDAP VACCINE 7 YRS/> IM: CPT | Performed by: EMERGENCY MEDICINE

## 2024-09-04 PROCEDURE — 70450 CT HEAD/BRAIN W/O DYE: CPT

## 2024-09-04 PROCEDURE — 82962 GLUCOSE BLOOD TEST: CPT

## 2024-09-04 RX ADMIN — TETANUS TOXOID, REDUCED DIPHTHERIA TOXOID AND ACELLULAR PERTUSSIS VACCINE, ADSORBED 0.5 ML: 5; 2.5; 8; 8; 2.5 SUSPENSION INTRAMUSCULAR at 13:49

## 2024-09-04 ASSESSMENT — PAIN SCALES - GENERAL
PAINLEVEL_OUTOF10: 3
PAINLEVEL_OUTOF10: 1

## 2024-09-15 ENCOUNTER — HOSPITAL ENCOUNTER (INPATIENT)
Facility: HOSPITAL | Age: 84
LOS: 2 days | Discharge: HOME HEALTH | End: 2024-09-17
Attending: EMERGENCY MEDICINE
Payer: MEDICARE

## 2024-09-15 ENCOUNTER — APPOINTMENT (OUTPATIENT)
Dept: GENERAL RADIOLOGY | Facility: HOSPITAL | Age: 84
End: 2024-09-15
Payer: MEDICARE

## 2024-09-15 ENCOUNTER — APPOINTMENT (OUTPATIENT)
Dept: GENERAL RADIOLOGY | Facility: HOSPITAL | Age: 84
DRG: 178 | End: 2024-09-15
Payer: MEDICARE

## 2024-09-15 ENCOUNTER — HOSPITAL ENCOUNTER (INPATIENT)
Facility: HOSPITAL | Age: 84
LOS: 2 days | Discharge: HOME HEALTH | DRG: 178 | End: 2024-09-17
Attending: EMERGENCY MEDICINE
Payer: MEDICARE

## 2024-09-15 DIAGNOSIS — R09.02 HYPOXIA: ICD-10-CM

## 2024-09-15 DIAGNOSIS — U07.1 COVID-19: ICD-10-CM

## 2024-09-15 DIAGNOSIS — J18.9 COMMUNITY ACQUIRED PNEUMONIA OF RIGHT LOWER LOBE OF LUNG: Primary | ICD-10-CM

## 2024-09-15 PROBLEM — D64.9 ANEMIA: Status: ACTIVE | Noted: 2024-09-15

## 2024-09-15 LAB
ALBUMIN SERPL-MCNC: 4 G/DL (ref 3.2–4.8)
ALBUMIN/GLOB SERPL: 1.5 {RATIO} (ref 1–2)
ALP LIVER SERPL-CCNC: 94 U/L
ALT SERPL-CCNC: 13 U/L
ANION GAP SERPL CALC-SCNC: 8 MMOL/L (ref 0–18)
AST SERPL-CCNC: 16 U/L (ref ?–34)
ATRIAL RATE: 94 BPM
BASOPHILS # BLD AUTO: 0.05 X10(3) UL (ref 0–0.2)
BASOPHILS NFR BLD AUTO: 0.4 %
BILIRUB SERPL-MCNC: 0.4 MG/DL (ref 0.2–1.1)
BILIRUB UR QL STRIP.AUTO: NEGATIVE
BUN BLD-MCNC: 31 MG/DL (ref 9–23)
CALCIUM BLD-MCNC: 9.5 MG/DL (ref 8.7–10.4)
CHLORIDE SERPL-SCNC: 105 MMOL/L (ref 98–112)
CLARITY UR REFRACT.AUTO: CLEAR
CO2 SERPL-SCNC: 24 MMOL/L (ref 21–32)
CREAT BLD-MCNC: 2.29 MG/DL
EGFRCR SERPLBLD CKD-EPI 2021: 27 ML/MIN/1.73M2 (ref 60–?)
EOSINOPHIL # BLD AUTO: 0.27 X10(3) UL (ref 0–0.7)
EOSINOPHIL NFR BLD AUTO: 2.2 %
ERYTHROCYTE [DISTWIDTH] IN BLOOD BY AUTOMATED COUNT: 13.1 %
FLUAV + FLUBV RNA SPEC NAA+PROBE: NEGATIVE
FLUAV + FLUBV RNA SPEC NAA+PROBE: NEGATIVE
GLOBULIN PLAS-MCNC: 2.7 G/DL (ref 2–3.5)
GLUCOSE BLD-MCNC: 116 MG/DL (ref 70–99)
GLUCOSE UR STRIP.AUTO-MCNC: NORMAL MG/DL
HCT VFR BLD AUTO: 33.6 %
HGB BLD-MCNC: 11.1 G/DL
IMM GRANULOCYTES # BLD AUTO: 0.08 X10(3) UL (ref 0–1)
IMM GRANULOCYTES NFR BLD: 0.7 %
KETONES UR STRIP.AUTO-MCNC: NEGATIVE MG/DL
LEUKOCYTE ESTERASE UR QL STRIP.AUTO: NEGATIVE
LYMPHOCYTES # BLD AUTO: 0.96 X10(3) UL (ref 1–4)
LYMPHOCYTES NFR BLD AUTO: 7.8 %
MCH RBC QN AUTO: 32.8 PG (ref 26–34)
MCHC RBC AUTO-ENTMCNC: 33 G/DL (ref 31–37)
MCV RBC AUTO: 99.4 FL
MONOCYTES # BLD AUTO: 0.82 X10(3) UL (ref 0.1–1)
MONOCYTES NFR BLD AUTO: 6.7 %
NEUTROPHILS # BLD AUTO: 10.05 X10 (3) UL (ref 1.5–7.7)
NEUTROPHILS # BLD AUTO: 10.05 X10(3) UL (ref 1.5–7.7)
NEUTROPHILS NFR BLD AUTO: 82.2 %
NITRITE UR QL STRIP.AUTO: NEGATIVE
OSMOLALITY SERPL CALC.SUM OF ELEC: 292 MOSM/KG (ref 275–295)
P AXIS: 26 DEGREES
P-R INTERVAL: 180 MS
PH UR STRIP.AUTO: 6.5 [PH] (ref 5–8)
PLATELET # BLD AUTO: 223 10(3)UL (ref 150–450)
POTASSIUM SERPL-SCNC: 4.6 MMOL/L (ref 3.5–5.1)
PROCALCITONIN SERPL-MCNC: 0.24 NG/ML (ref ?–0.05)
PROT SERPL-MCNC: 6.7 G/DL (ref 5.7–8.2)
PROT UR STRIP.AUTO-MCNC: 30 MG/DL
Q-T INTERVAL: 346 MS
QRS DURATION: 124 MS
QTC CALCULATION (BEZET): 432 MS
R AXIS: -79 DEGREES
RBC # BLD AUTO: 3.38 X10(6)UL
RBC UR QL AUTO: NEGATIVE
RSV RNA SPEC NAA+PROBE: NEGATIVE
SARS-COV-2 RNA RESP QL NAA+PROBE: DETECTED
SODIUM SERPL-SCNC: 137 MMOL/L (ref 136–145)
SP GR UR STRIP.AUTO: 1.01 (ref 1–1.03)
T AXIS: 36 DEGREES
TROPONIN I SERPL HS-MCNC: 20 NG/L
UROBILINOGEN UR STRIP.AUTO-MCNC: NORMAL MG/DL
VENTRICULAR RATE: 94 BPM
WBC # BLD AUTO: 12.2 X10(3) UL (ref 4–11)

## 2024-09-15 PROCEDURE — 84145 PROCALCITONIN (PCT): CPT | Performed by: EMERGENCY MEDICINE

## 2024-09-15 PROCEDURE — 0241U SARS-COV-2/FLU A AND B/RSV BY PCR (GENEXPERT): CPT

## 2024-09-15 PROCEDURE — 80053 COMPREHEN METABOLIC PANEL: CPT | Performed by: EMERGENCY MEDICINE

## 2024-09-15 PROCEDURE — 96375 TX/PRO/DX INJ NEW DRUG ADDON: CPT

## 2024-09-15 PROCEDURE — 85025 COMPLETE CBC W/AUTO DIFF WBC: CPT

## 2024-09-15 PROCEDURE — 99285 EMERGENCY DEPT VISIT HI MDM: CPT

## 2024-09-15 PROCEDURE — 96365 THER/PROPH/DIAG IV INF INIT: CPT

## 2024-09-15 PROCEDURE — 93005 ELECTROCARDIOGRAM TRACING: CPT

## 2024-09-15 PROCEDURE — 0241U SARS-COV-2/FLU A AND B/RSV BY PCR (GENEXPERT): CPT | Performed by: EMERGENCY MEDICINE

## 2024-09-15 PROCEDURE — 87040 BLOOD CULTURE FOR BACTERIA: CPT | Performed by: EMERGENCY MEDICINE

## 2024-09-15 PROCEDURE — 85025 COMPLETE CBC W/AUTO DIFF WBC: CPT | Performed by: EMERGENCY MEDICINE

## 2024-09-15 PROCEDURE — 93010 ELECTROCARDIOGRAM REPORT: CPT

## 2024-09-15 PROCEDURE — 94640 AIRWAY INHALATION TREATMENT: CPT

## 2024-09-15 PROCEDURE — 36415 COLL VENOUS BLD VENIPUNCTURE: CPT

## 2024-09-15 PROCEDURE — 80053 COMPREHEN METABOLIC PANEL: CPT

## 2024-09-15 PROCEDURE — 84484 ASSAY OF TROPONIN QUANT: CPT

## 2024-09-15 PROCEDURE — 84484 ASSAY OF TROPONIN QUANT: CPT | Performed by: EMERGENCY MEDICINE

## 2024-09-15 PROCEDURE — 71045 X-RAY EXAM CHEST 1 VIEW: CPT

## 2024-09-15 PROCEDURE — 81001 URINALYSIS AUTO W/SCOPE: CPT | Performed by: EMERGENCY MEDICINE

## 2024-09-15 RX ORDER — LOSARTAN POTASSIUM 50 MG/1
50 TABLET ORAL 2 TIMES DAILY
Status: DISCONTINUED | OUTPATIENT
Start: 2024-09-15 | End: 2024-09-17

## 2024-09-15 RX ORDER — METOPROLOL TARTRATE 50 MG
50 TABLET ORAL
Status: DISCONTINUED | OUTPATIENT
Start: 2024-09-15 | End: 2024-09-17

## 2024-09-15 RX ORDER — SODIUM CHLORIDE 9 MG/ML
INJECTION, SOLUTION INTRAVENOUS CONTINUOUS
Status: ACTIVE | OUTPATIENT
Start: 2024-09-15 | End: 2024-09-15

## 2024-09-15 RX ORDER — ONDANSETRON 2 MG/ML
4 INJECTION INTRAMUSCULAR; INTRAVENOUS EVERY 6 HOURS PRN
Status: DISCONTINUED | OUTPATIENT
Start: 2024-09-15 | End: 2024-09-17

## 2024-09-15 RX ORDER — BISACODYL 10 MG
10 SUPPOSITORY, RECTAL RECTAL
Status: DISCONTINUED | OUTPATIENT
Start: 2024-09-15 | End: 2024-09-17

## 2024-09-15 RX ORDER — METOCLOPRAMIDE HYDROCHLORIDE 5 MG/ML
10 INJECTION INTRAMUSCULAR; INTRAVENOUS EVERY 8 HOURS PRN
Status: DISCONTINUED | OUTPATIENT
Start: 2024-09-15 | End: 2024-09-16

## 2024-09-15 RX ORDER — ACETAMINOPHEN 500 MG
500 TABLET ORAL EVERY 4 HOURS PRN
Status: DISCONTINUED | OUTPATIENT
Start: 2024-09-15 | End: 2024-09-17

## 2024-09-15 RX ORDER — SODIUM CHLORIDE, SODIUM LACTATE, POTASSIUM CHLORIDE, CALCIUM CHLORIDE 600; 310; 30; 20 MG/100ML; MG/100ML; MG/100ML; MG/100ML
INJECTION, SOLUTION INTRAVENOUS CONTINUOUS
Status: ACTIVE | OUTPATIENT
Start: 2024-09-15 | End: 2024-09-16

## 2024-09-15 RX ORDER — ACETAMINOPHEN 500 MG
1000 TABLET ORAL ONCE
Status: COMPLETED | OUTPATIENT
Start: 2024-09-15 | End: 2024-09-15

## 2024-09-15 RX ORDER — DILTIAZEM HYDROCHLORIDE 120 MG/1
120 CAPSULE, EXTENDED RELEASE ORAL DAILY
Status: DISCONTINUED | OUTPATIENT
Start: 2024-09-16 | End: 2024-09-17

## 2024-09-15 RX ORDER — SODIUM PHOSPHATE, DIBASIC AND SODIUM PHOSPHATE, MONOBASIC 7; 19 G/230ML; G/230ML
1 ENEMA RECTAL ONCE AS NEEDED
Status: DISCONTINUED | OUTPATIENT
Start: 2024-09-15 | End: 2024-09-17

## 2024-09-15 RX ORDER — ASCORBIC ACID 500 MG
500 TABLET ORAL DAILY
Status: DISCONTINUED | OUTPATIENT
Start: 2024-09-15 | End: 2024-09-17

## 2024-09-15 RX ORDER — FLUTICASONE PROPIONATE AND SALMETEROL 250; 50 UG/1; UG/1
1 POWDER RESPIRATORY (INHALATION) 2 TIMES DAILY
Status: DISCONTINUED | OUTPATIENT
Start: 2024-09-15 | End: 2024-09-17

## 2024-09-15 RX ORDER — HYDRALAZINE HYDROCHLORIDE 50 MG/1
50 TABLET, FILM COATED ORAL 3 TIMES DAILY
Status: DISCONTINUED | OUTPATIENT
Start: 2024-09-15 | End: 2024-09-17

## 2024-09-15 RX ORDER — SENNOSIDES 8.6 MG
17.2 TABLET ORAL NIGHTLY PRN
Status: DISCONTINUED | OUTPATIENT
Start: 2024-09-15 | End: 2024-09-17

## 2024-09-15 RX ORDER — POLYETHYLENE GLYCOL 3350 17 G/17G
17 POWDER, FOR SOLUTION ORAL DAILY PRN
Status: DISCONTINUED | OUTPATIENT
Start: 2024-09-15 | End: 2024-09-17

## 2024-09-15 NOTE — ED PROVIDER NOTES
Patient Seen in: [facility] Emergency Department      History     Chief Complaint   Patient presents with    Difficulty Breathing     Stated Complaint: DOMINGO    Subjective:   HPI    Patient with a history of COPD osteoarthritis hypertension and prostate cancer presents to the emergency department he called his family this morning because after sleeping an hour and a half later than he normally does he went to get out of bed could not catch his breath and could not walk across the room.  Patient noted to be very weak they arrived at the house after he called them and noted that he was in with a disc call respiratory distress on their arrival and they could not get him to take his rescue inhaler.  They were finally able to get him to take his rescue inhalers and they brought him to the hospital.  He t states to me that he is just feeling very weak and short of breath.  He is hot to the touch on my arrival in the room  Objective:   Past Medical History:    Gonzalez esophagus    Benign essential HTN    Cancer (HCC)    prostate    Cataract    COPD (chronic obstructive pulmonary disease) (HCC)    Glaucoma    High blood pressure    High cholesterol    History of blood transfusion    History of COVID-19    History of prostate cancer    History of stomach ulcers    Osteoarthritis    Renal disorder    Visual impairment              Past Surgical History:   Procedure Laterality Date    Cardiac pacemaker placement      Cataract      Colonoscopy      Other      radical prostatectomy    Upper gi endoscopy,exam                  Social History     Socioeconomic History    Marital status:    Tobacco Use    Smoking status: Former     Current packs/day: 0.00     Average packs/day: 1 pack/day for 25.9 years (25.9 ttl pk-yrs)     Types: Cigarettes     Start date: 1959     Quit date: 1985     Years since quittin.7    Smokeless tobacco: Never   Vaping Use    Vaping status: Never Used   Substance and Sexual  Activity    Alcohol use: Yes    Drug use: Never   Other Topics Concern    Seat Belt Yes     Social Determinants of Health     Food Insecurity: No Food Insecurity (10/12/2023)    Food Insecurity     Food Insecurity: Never true   Transportation Needs: No Transportation Needs (10/12/2023)    Transportation Needs     Lack of Transportation: No   Housing Stability: Low Risk  (10/12/2023)    Housing Stability     Housing Instability: No              Review of Systems    Positive for stated Chief Complaint: Difficulty Breathing    Other systems are as noted in HPI.  Constitutional and vital signs reviewed.      All other systems reviewed and negative except as noted above.    Physical Exam     ED Triage Vitals   BP 09/15/24 0815 138/72   Pulse 09/15/24 0815 90   Resp 09/15/24 0815 17   Temp 09/15/24 0845 (!) 100.9 °F (38.3 °C)   Temp src 09/15/24 0845 Oral   SpO2 09/15/24 0815 98 %   O2 Device 09/15/24 0815 None (Room air)       Current Vitals:   Vital Signs  BP: 144/63  Pulse: 89  Resp: 20  Temp: (!) 100.9 °F (38.3 °C)  Temp src: Oral  MAP (mmHg): 87    Oxygen Therapy  SpO2: 97 %  O2 Device: None (Room air)            Physical Exam    84-year-old gentleman lying on emergency department bed his pupils are equal round reactive to light he speaking in full sentences oropharynx is dry his neck is supple his lungs reveal crackles in the right lower lobe his heart has a regular rate and rhythm without significant murmurs rubs or gallops with a 2/6 systolic murmur without rubs or gallops abdomen is soft nontender nondistended upper and lower extremities are benign patient requires assistance to even sit up in the bed so that I can listen to his lungs.  He states that he is not normally so weak.  His family is seated at bedside.  Please note that as he desaturates to 78% with a good week form when I sit him up in the bed and is immediately dyspneic  ED Course     Labs Reviewed   COMP METABOLIC PANEL (14) - Abnormal; Notable for  the following components:       Result Value    Glucose 116 (*)     BUN 31 (*)     Creatinine 2.29 (*)     eGFR-Cr 27 (*)     All other components within normal limits   CBC WITH DIFFERENTIAL WITH PLATELET - Abnormal; Notable for the following components:    WBC 12.2 (*)     RBC 3.38 (*)     HGB 11.1 (*)     HCT 33.6 (*)     Neutrophil Absolute Prelim 10.05 (*)     Neutrophil Absolute 10.05 (*)     Lymphocyte Absolute 0.96 (*)     All other components within normal limits   TROPONIN I HIGH SENSITIVITY - Normal   URINALYSIS WITH CULTURE REFLEX   PROCALCITONIN   RAINBOW DRAW LAVENDER   RAINBOW DRAW LIGHT GREEN   RAINBOW DRAW BLUE   SARS-COV-2/FLU A AND B/RSV BY PCR (GENEXPERT)   BLOOD CULTURE   BLOOD CULTURE     EKG    Rate, intervals and axes as noted on EKG Report.  Rate: 94  Rhythm: Sinus Rhythm  Readin bpm normal sinus rhythm no acute ST elevation or significant ST depression to suggest acute ischemia.  This is a bifascicular block but this bifascicular block has been present on the patient's EKGs back to 2023.              ED Course as of 09/15/24 0902  ------------------------------------------------------------  Time: 09/15 0846  Value: WBC(!): 12.2  Comment: RLL pna   ------------------------------------------------------------  Time: 09/15 0847  Comment: Independent interpretation of the chest x-ray as reviewed by myself shows a right lower lobe pneumonia patient clearly with crackles in that right lower lobe as well.  ------------------------------------------------------------  Time: 09/15 0847  Value: CREATININE(!): 2.29  Comment: Creatinine is stable not acute  ------------------------------------------------------------  Time: 09/15 0847  Value: WBC(!): 12.2  Comment: Leukocytosis of 12.2  ------------------------------------------------------------  Time: 09/15 0848  Comment: Had patient sit up in the bed so that I could listen to his lungs and oxygen saturations were 70%              MDM         Admission disposition: 9/15/2024  9:02 AM         84-year-old presents to the emergency department with family called family this morning because he could not breathe and was very weak he was short of breath he has a history of COPD    Differential diagnosis includes COPD exacerbation, acute coronary syndrome, infection, pneumonia, COVID,      Patient noted to be febrile here to 100.9 not hypotensive but hypoxic when I went to set him up.  He has crackles in the right lower lobe consistent with pneumonia.  Will start broad-spectrum antibiotics may very well be viral we will get a procalcitonin                     Medical Decision Making      Disposition and Plan     Clinical Impression:  1. Community acquired pneumonia of right lower lobe of lung    2. Hypoxia         Disposition:  Admit  9/15/2024  9:02 am    Follow-up:  No follow-up provider specified.        Medications Prescribed:  Current Discharge Medication List                            Hospital Problems       Present on Admission  Date Reviewed: 4/4/2022            ICD-10-CM Noted POA    * (Principal) Community acquired pneumonia of right lower lobe of lung J18.9 9/15/2024 Unknown    Anemia D64.9 9/15/2024 Yes

## 2024-09-15 NOTE — ED QUICK NOTES
Orders for admission, patient is aware of plan and ready to go upstairs. Any questions, please call ED RN Monica at extension [phone].     Patient Covid vaccination status: Fully vaccinated     COVID Test Ordered in ED: SARS-CoV-2/Flu A and B/RSV by PCR (GeneXpert)    COVID Suspicion at Admission: N/A    Running Infusions:      Mental Status/LOC at time of transport: AOX4    Other pertinent information: Pt here for SOB from independent living. COVID +.  CIWA score: N/A   NIH score:  N/A

## 2024-09-15 NOTE — ED INITIAL ASSESSMENT (HPI)
Patient in per EMS from independent [facility]. Hx COPD coming DOMINGO since 0300 congestion, family states last time he felt this way he ended up COVID +

## 2024-09-15 NOTE — PROGRESS NOTES
NURSING ADMISSION NOTE      Patient admitted via Cart to room 526. Family at bedside  Oriented to room.  Safety precautions initiated.  Bed in low position.  Call light in reach.  MD made aware and awaiting orders

## 2024-09-15 NOTE — H&P
[facility]ist History and Physical      Chief Complaint   Patient presents with    Difficulty Breathing        PCP: Cecelia Baez MD      History of Present Illness: Patient is a 84 year old male with PMH sig for COPD, PAF on eliquis, s/p PPM, CKD 3, hypertension, hyperlipidemia, history of prostate cancer presented with altered mental status.  He woke up much later than usual this morning and he could not catch his breath just walking across the room.  His family noted that he was very weak and he was looking very short of breath.  Denies any fevers or chills but he does report weakness.  In the ED he had a temperature of 100.9, other vitals were stable.  Labs significant for creatinine of 2.29, procalcitonin of 0.24, leukocytosis and he tested positive for COVID. Given ceftriaxone/azithromycin for presumed PNA (prior to COVID resulting) and admitted for further evaluation and treatment.     Past Medical History:    Gonzalez esophagus    Benign essential HTN    Cancer (HCC)    prostate    Cataract    COPD (chronic obstructive pulmonary disease) (HCC)    Glaucoma    High blood pressure    High cholesterol    History of blood transfusion    History of COVID-19    History of prostate cancer    History of stomach ulcers    Osteoarthritis    Renal disorder    Visual impairment      Past Surgical History:   Procedure Laterality Date    Cardiac pacemaker placement      Cataract      Colonoscopy      Other      radical prostatectomy    Upper gi endoscopy,exam          ALL:  Allergies   Allergen Reactions    Radiology Contrast Iodinated Dyes OTHER (SEE COMMENTS)     Caused rash , denies allergy to iodine     Bactrim [Sulfamethoxazole W/Trimethoprim] RASH        No current outpatient medications on file.       Social History     Tobacco Use    Smoking status: Former     Current packs/day: 0.00     Average packs/day: 1 pack/day for 25.9 years (25.9 ttl pk-yrs)     Types: Cigarettes     Start date: 2/12/1959     Quit date:  1985     Years since quittin.7    Smokeless tobacco: Never   Substance Use Topics    Alcohol use: Yes        Fam Hx  Family History   Problem Relation Age of Onset    Heart Disorder Father     Hypertension Father     Heart Disorder Paternal Grandfather     Hypertension Paternal Grandfather        Review of Systems  Comprehensive ROS reviewed and negative except for what is stated in HPI.      OBJECTIVE:  /55   Pulse 75   Temp (!) 100.9 °F (38.3 °C) (Oral)   Resp 23   Wt 157 lb (71.2 kg)   SpO2 100%   BMI 22.53 kg/m²   General:  Alert, no distress, appears stated age.    Head:  Normocephalic, without obvious abnormality, atraumatic.   Eyes:  Sclera anicteric, No conjunctival pallor, EOMs intact.    Nose: Nares normal. Septum midline. Mucosa normal. No drainage.   Throat: Lips, mucosa, and tongue normal. Teeth and gums normal.   Neck: Supple, symmetrical, trachea midline, no cervical or supraclavicular lymph adenopathy, thyroid: no enlargment/tenderness/nodules appreciated   Lungs:   Clear to auscultation bilaterally. Normal effort   Chest wall:  No tenderness or deformity.   Heart:  Regular rate and rhythm, S1, S2 normal, no murmur, rub or gallop appreciated   Abdomen:   Soft, non-tender. Bowel sounds normal. No masses,  No organomegaly. Non distended   Extremities: Extremities normal, atraumatic, no cyanosis or edema.   Skin: Skin color, texture, turgor normal. No rashes or lesions.    Neurologic: Normal strength, no focal deficit appreciated     Data Review:    LABS:   Lab Results   Component Value Date    WBC 12.2 09/15/2024    HGB 11.1 09/15/2024    HCT 33.6 09/15/2024    .0 09/15/2024    CREATSERUM 2.29 09/15/2024    BUN 31 09/15/2024     09/15/2024    K 4.6 09/15/2024     09/15/2024    CO2 24.0 09/15/2024     09/15/2024    CA 9.5 09/15/2024    ALB 4.0 09/15/2024    ALKPHO 94 09/15/2024    BILT 0.4 09/15/2024    TP 6.7 09/15/2024    AST 16 09/15/2024    ALT 13  09/15/2024       CXR: All imaging personally reviewed.      Radiology: XR CHEST AP PORTABLE  (CPT=71045)    Result Date: 9/15/2024  PROCEDURE:  XR CHEST AP PORTABLE  (CPT=71045)  TECHNIQUE:  AP chest radiograph was obtained.  COMPARISON:  [facility] , XR, XR CHEST AP PORTABLE  (CPT=71045), 5/20/2023, 11:06 PM.  INDICATIONS:  DOMINGO  PATIENT STATED HISTORY: (As transcribed by Technologist)  Patient stated he has been having difficulty breathing since last night. States he has a history of COPD.               CONCLUSION:   Stable cardiac and mediastinal contours.  Bibasilar interstitial fibrotic changes noted without pulmonary edema or focal airspace consolidation.  The pleural spaces are clear.  No acute osseous findings.   LOCATION:  [facility]      Dictated by (CST): Ezio Vargas MD on 9/15/2024 at 8:34 AM     Finalized by (CST): Ezio Vargas MD on 9/15/2024 at 8:35 AM          Assessment/Plan:     84 year old male with PMH sig for COPD, PAF on eliquis, s/p PPM, CKD 3, hypertension, hyperlipidemia, history of prostate cancer presented with altered mental status.    Acute COVID infection  - no indication for steroids  - no paxlovid due to CKD  - supportive cares  - gentle IVF  - low suspicion for bacterial superinfection, monitor off abx, no clear consolidation on CXR, COVID explains his symptoms, procal low     COPD, stable  - not in exacerbation  - home inhalers with MDI    PAF  - diltiazem, metoprolol  - eliquis    Essential HTN  - hydralazine, metoprolol    CKD3  - mild fluids    Hyperlipidemia  - statin    Hx prostate ca  - stable     FEN: regular diet, PT/OT  Proph: SCDs, eliquis  Code status: Full code     Outpatient records or previous hospital records reviewed.   [facility] hospitalist to continue to follow patient while in house    Dispo - likely home tomorrow     Micki Hare MD  [facility]  Hospitalist  Message over [employer]  Pager: [phone]        **Certification      PHYSICIAN  Certification of Need for Inpatient Hospitalization - Initial Certification    Patient will require inpatient services that will reasonably be expected to span two midnight's based on the clinical documentation in H+P.   Based on patients current state of illness, I anticipate that, after discharge, patient will require TBD.

## 2024-09-16 LAB
ALBUMIN SERPL-MCNC: 3.7 G/DL (ref 3.2–4.8)
ALBUMIN/GLOB SERPL: 1.2 {RATIO} (ref 1–2)
ALP LIVER SERPL-CCNC: 82 U/L
ALT SERPL-CCNC: 12 U/L
ANION GAP SERPL CALC-SCNC: 8 MMOL/L (ref 0–18)
AST SERPL-CCNC: 21 U/L (ref ?–34)
BASOPHILS # BLD AUTO: 0.05 X10(3) UL (ref 0–0.2)
BASOPHILS NFR BLD AUTO: 0.5 %
BILIRUB SERPL-MCNC: 0.3 MG/DL (ref 0.2–1.1)
BUN BLD-MCNC: 29 MG/DL (ref 9–23)
CALCIUM BLD-MCNC: 9.7 MG/DL (ref 8.7–10.4)
CHLORIDE SERPL-SCNC: 107 MMOL/L (ref 98–112)
CO2 SERPL-SCNC: 25 MMOL/L (ref 21–32)
CREAT BLD-MCNC: 2.11 MG/DL
EGFRCR SERPLBLD CKD-EPI 2021: 30 ML/MIN/1.73M2 (ref 60–?)
EOSINOPHIL # BLD AUTO: 0.21 X10(3) UL (ref 0–0.7)
EOSINOPHIL NFR BLD AUTO: 2.3 %
ERYTHROCYTE [DISTWIDTH] IN BLOOD BY AUTOMATED COUNT: 13.3 %
GLOBULIN PLAS-MCNC: 3 G/DL (ref 2–3.5)
GLUCOSE BLD-MCNC: 107 MG/DL (ref 70–99)
HCT VFR BLD AUTO: 33.4 %
HGB BLD-MCNC: 11.1 G/DL
IMM GRANULOCYTES # BLD AUTO: 0.04 X10(3) UL (ref 0–1)
IMM GRANULOCYTES NFR BLD: 0.4 %
LYMPHOCYTES # BLD AUTO: 0.8 X10(3) UL (ref 1–4)
LYMPHOCYTES NFR BLD AUTO: 8.6 %
MAGNESIUM SERPL-MCNC: 1.9 MG/DL (ref 1.6–2.6)
MCH RBC QN AUTO: 33.5 PG (ref 26–34)
MCHC RBC AUTO-ENTMCNC: 33.2 G/DL (ref 31–37)
MCV RBC AUTO: 100.9 FL
MONOCYTES # BLD AUTO: 1.1 X10(3) UL (ref 0.1–1)
MONOCYTES NFR BLD AUTO: 11.8 %
NEUTROPHILS # BLD AUTO: 7.13 X10 (3) UL (ref 1.5–7.7)
NEUTROPHILS # BLD AUTO: 7.13 X10(3) UL (ref 1.5–7.7)
NEUTROPHILS NFR BLD AUTO: 76.4 %
OSMOLALITY SERPL CALC.SUM OF ELEC: 296 MOSM/KG (ref 275–295)
PLATELET # BLD AUTO: 209 10(3)UL (ref 150–450)
POTASSIUM SERPL-SCNC: 4 MMOL/L (ref 3.5–5.1)
PROT SERPL-MCNC: 6.7 G/DL (ref 5.7–8.2)
RBC # BLD AUTO: 3.31 X10(6)UL
SODIUM SERPL-SCNC: 140 MMOL/L (ref 136–145)
WBC # BLD AUTO: 9.3 X10(3) UL (ref 4–11)

## 2024-09-16 PROCEDURE — 85025 COMPLETE CBC W/AUTO DIFF WBC: CPT | Performed by: HOSPITALIST

## 2024-09-16 PROCEDURE — 94640 AIRWAY INHALATION TREATMENT: CPT

## 2024-09-16 PROCEDURE — 83735 ASSAY OF MAGNESIUM: CPT | Performed by: HOSPITALIST

## 2024-09-16 PROCEDURE — 97161 PT EVAL LOW COMPLEX 20 MIN: CPT

## 2024-09-16 PROCEDURE — 97535 SELF CARE MNGMENT TRAINING: CPT

## 2024-09-16 PROCEDURE — 97165 OT EVAL LOW COMPLEX 30 MIN: CPT

## 2024-09-16 PROCEDURE — 97530 THERAPEUTIC ACTIVITIES: CPT

## 2024-09-16 PROCEDURE — 80053 COMPREHEN METABOLIC PANEL: CPT | Performed by: HOSPITALIST

## 2024-09-16 RX ORDER — METOCLOPRAMIDE HYDROCHLORIDE 5 MG/ML
5 INJECTION INTRAMUSCULAR; INTRAVENOUS EVERY 8 HOURS PRN
Status: DISCONTINUED | OUTPATIENT
Start: 2024-09-16 | End: 2024-09-17

## 2024-09-16 RX ORDER — GUAIFENESIN 600 MG/1
600 TABLET, EXTENDED RELEASE ORAL 2 TIMES DAILY
Status: DISCONTINUED | OUTPATIENT
Start: 2024-09-16 | End: 2024-09-17

## 2024-09-16 NOTE — OCCUPATIONAL THERAPY NOTE
OCCUPATIONAL THERAPY EVALUATION - INPATIENT    Room Number: 526/526-A  Evaluation Date: 9/16/2024     Type of Evaluation: Initial  Presenting Problem: COVID    Physician Order: IP Consult to Occupational Therapy  Reason for Therapy:  ADL/IADL Dysfunction and Discharge Planning      OCCUPATIONAL THERAPY ASSESSMENT   Patient is a 84 year old male admitted on 9/15/2024 with Presenting Problem: COVID.    Patient is currently functioning near baseline with toileting, upper body dressing, lower body dressing, grooming, bed mobility, transfers, static sitting balance, dynamic sitting balance, static standing balance, dynamic standing balance, maintaining seated position, and functional standing tolerance.  Prior to admission, patient's baseline is Mod I.  Patient met all OT goals at SUp level.  Patient reports no further questions/concerns at this time.     Patient will benefit from continued skilled OT Services at discharge to promote prior level of function and safety with additional support and return home with home health OT      WEIGHT BEARING RESTRICTION                   Recommendations for nursing staff:   Transfers: Sup  Toileting location: Toilet    EVALUATION SESSION:  Patient at start of session: Supine in bed for session  FUNCTIONAL TRANSFER ASSESSMENT  Sit to Stand: Edge of Bed  Edge of Bed: Supervision  Toilet Transfer: Supervision    BED MOBILITY  Rolling: Supervision  Supine to Sit : Supervision  Sit to Supine (OT): Supervision  Scooting: Sup to EOB    BALANCE ASSESSMENT  Static Sitting: Supervision  Sitting Bilateral: Supervision  Static Standing: Supervision  Standing Bilateral: Supervision    FUNCTIONAL ADL ASSESSMENT  UB Dressing Seated: Supervision (for robe 2/2 pt being cold)  LB Dressing Seated: Supervision (for socks at EOB in figure four)  Toileting Seated: Supervision (at std height toilet)      ACTIVITY TOLERANCE: vitals stable                         O2 SATURATIONS       COGNITION  Overall  Cognitive Status:  WFL - within functional limits  COGNITION ASSESSMENTS       Upper Extremity:   ROM: within functional limits   Strength: is within functional limits   Coordination:  Gross motor: WNl  Fine motor: WNL  Sensation: Light touch:  intact    EDUCATION PROVIDED  Patient: Role of Occupational Therapy; Plan of Care  Patient's Response to Education: Verbalized Understanding; Returned Demonstration    Equipment used: RW  Demonstrates functional use    Therapist comments: Pt reported fatigue at home, pt educated on work simplification and energy conservation for at home to assist with independence with fatigue at home.    Patient End of Session: In bed;Needs met;RN aware of session/findings;All patient questions and concerns addressed;Alarm set    OCCUPATIONAL PROFILE    HOME SITUATION  Type of Home: Independent living facility ([facility])  Home Layout: One level  Lives With: Alone    Toilet and Equipment: Standard height toilet  Shower/Tub and Equipment: Walk-in shower  Other Equipment: None    Occupation/Status: retired             Prior Level of Function: Pt typically independent with ADLs and mobility. Pt does not use AD.    SUBJECTIVE  Pt stated, \"I want to get back in bed.\"    PAIN ASSESSMENT  Ratin  Location: no pain at this time       OBJECTIVE  Precautions: Bed/chair alarm  Fall Risk: High fall risk    WEIGHT BEARING RESTRICTION                   AM-PAC ‘6-Clicks’ Inpatient Daily Activity Short Form  -   Putting on and taking off regular lower body clothing?: A Little  -   Bathing (including washing, rinsing, drying)?: A Little  -   Toileting, which includes using toilet, bedpan or urinal? : A Little  -   Putting on and taking off regular upper body clothing?: A Little  -   Taking care of personal grooming such as brushing teeth?: A Little  -   Eating meals?: A Little    AM-PAC Score:  Score: 18  Approx Degree of Impairment: 46.65%  Standardized Score (AM-PAC Scale): 38.66      ADDITIONAL TESTS      NEUROLOGICAL FINDINGS        PLAN   Patient has been evaluated and presents with no skilled Occupational Therapy needs at this time.  Patient discharged from Occupational Therapy services.  Please re-order if a new functional limitation presents during this admission.      Patient Evaluation Complexity Level:   Occupational Profile/Medical History LOW - Brief history including review of medical or therapy records    Specific performance deficits impacting engagement in ADL/IADL LOW  1 - 3 performance deficits    Client Assessment/Performance Deficits LOW - No comorbidities nor modifications of tasks    Clinical Decision Making LOW - Analysis of occupational profile, problem-focused assessments, limited treatment options    Overall Complexity LOW     OT Session Time: 20 minutes  Self-Care Home Management: 10 minutes  Therapeutic Activity: 0 minutes  Neuromuscular Re-education: 0 minutes  Therapeutic Exercise: 0 minutes  Cognitive Skills: 0 minutes  Sensory Integrative: 0 minutes  Orthotic Management and Trainin minutes  Can add/delete any of these

## 2024-09-16 NOTE — PROGRESS NOTES
[facility] [place]  Hospitalist Progress Note                                                                     [facility]   part of [facility]        Oswaldo Vang JrRia  2/12/1940    SUBJECTIVE:  Patient seen and examined.   Feeling weak and tired today.   Doesn't feel ready to dc.  Denies Cp/SOB.  NAD.       OBJECTIVE:  Temp:  [97.9 °F (36.6 °C)-98.9 °F (37.2 °C)] 97.9 °F (36.6 °C)  Pulse:  [] 75  Resp:  [18-20] 18  BP: (145-173)/(53-77) 173/72  SpO2:  [95 %-100 %] 95 %  Exam  Gen: No acute distress, alert and oriented x3, no focal neurologic deficits  Pulm: Lungs clear bilaterally, normal respiratory effort  CV: Heart with regular rate and rhythm, no murmur.  Normal PMI.    Abd: Abdomen soft, nontender, nondistended, no organomegaly, bowel sounds present  MSK: Full range of motion in extremities, no clubbing, no cyanosis  Skin: no rashes or lesions    Labs:   Recent Labs   Lab 09/15/24  0818 09/16/24  0854   WBC 12.2* 9.3   HGB 11.1* 11.1*   MCV 99.4 100.9*   .0 209.0       Recent Labs   Lab 09/15/24  0818 09/16/24  0854    140   K 4.6 4.0    107   CO2 24.0 25.0   BUN 31* 29*   CREATSERUM 2.29* 2.11*   CA 9.5 9.7   MG  --  1.9   * 107*       Recent Labs   Lab 09/15/24  0818 09/16/24  0854   ALT 13 12   AST 16 21   ALB 4.0 3.7       No results for input(s): \"PGLU\" in the last 168 hours.    Meds:   Scheduled:    ascorbic acid  500 mg Oral Daily    fluticasone-salmeterol  1 puff Inhalation BID    dilTIAZem ER  120 mg Oral Daily    apixaban  2.5 mg Oral BID    hydrALAZINE  50 mg Oral TID    losartan  50 mg Oral BID    metoprolol tartrate  50 mg Oral 2x Daily(Beta Blocker)    umeclidinium bromide  1 puff Inhalation Daily     Continuous Infusions:   PRN:   metoclopramide    acetaminophen    melatonin    polyethylene glycol (PEG 3350)    sennosides    bisacodyl    fleet enema    ondansetron    Assessment/Plan:  Principal  Problem:    Community acquired pneumonia of right lower lobe of lung  Active Problems:    Anemia    Hypoxia    84 year old male with PMH sig for COPD, PAF on eliquis, s/p PPM, CKD 3, hypertension, hyperlipidemia, history of prostate cancer presented with altered mental status.     Acute COVID infection  - no indication for steroids  - no paxlovid due to CKD  - supportive cares  - gentle IVF completed  - low suspicion for bacterial superinfection, monitor off abx, no clear consolidation on CXR, was treated for PNA in ED prior to COVID resulting positive, procal low     COPD, stable  - not in exacerbation  - home inhalers with MDI     PAF  - diltiazem, metoprolol  - eliquis     Essential HTN  - hydralazine, metoprolol     CKD3  - mild fluids on admission  - Cr near baseline (1.9-2)      Hyperlipidemia  - statin     Hx prostate ca  - stable      FEN: regular diet, PT/OT  Proph: SCDs, eliquis  Code status: Full code     Dispo - likely dc tomorrow     Micki Hare MD  [facility]  Hospitalist  Message over [employer]  Pager: [phone]

## 2024-09-16 NOTE — PROGRESS NOTES
Patient Aox4. RA at rest. 2 L for exertion. On tele, NSR. Hx of afib, eliquis. Regular diet. Voids. SBA with walker. Pt updated on POC, hopeful dc tomorrow, call light within reach.

## 2024-09-16 NOTE — PHYSICAL THERAPY NOTE
PHYSICAL THERAPY EVALUATION - INPATIENT     Room Number: 526/526-A  Evaluation Date: 9/16/2024  Type of Evaluation: Initial  Physician Order: PT Eval and Treat    Presenting Problem: PNA     Reason for Therapy: Mobility Dysfunction and Discharge Planning    PHYSICAL THERAPY ASSESSMENT   Patient is currently functioning near baseline with bed mobility, transfers, and gait.  Prior to admission, patient's baseline is ambulatory with RW.  Patient is requiring contact guard assist as a result of the following impairments: impaired dynamic balance. Physical Therapy will continue to follow for duration of hospitalization.      Patient will benefit from continued skilled PT Services at discharge to promote prior level of function and safety with additional support and return home with home health PT.    PLAN  PT Treatment Plan: Bed mobility;Body mechanics;Endurance;Energy conservation;Patient education;Family education;Gait training;Strengthening;Range of motion;Stair training;Balance training;Transfer training  Rehab Potential : Good  Frequency (Obs):  (2-3x/week)  Number of Visits to Meet Established Goals: 3      CURRENT GOALS    Goal #1    Goal #2    Goal #3 Patient is able to ambulate 50 feet with assist device: walker - rolling at assistance level: supervision     Goal #4    Goal #5    Goal #6    Goal Comments: Goals established on 9/16/2024      PHYSICAL THERAPY MEDICAL/SOCIAL HISTORY  History related to current admission: Patient is a 84 year old male admitted on 9/15/2024 from home for PNA.       HOME SITUATION  Type of Home: Independent living facility ([facility])   Home Layout: One level                Lives With: Alone     Patient Owned Equipment: Rolling walker       Prior Level of [place]: Pt reports mod ind with RW.  Pt states staff present at ind living, however do not assist.  Pt states son lives close by and stops by frequently.      SUBJECTIVE  \"I feel pretty good.\"        OBJECTIVE  Precautions:  Bed/chair alarm  Fall Risk: High fall risk    WEIGHT BEARING RESTRICTION                   PAIN ASSESSMENT  Ratin          COGNITION  Memory:  impaired working memory    RANGE OF MOTION AND STRENGTH ASSESSMENT  Upper extremity ROM and strength are within functional limits     Lower extremity ROM is within functional limits     Lower extremity strength is within functional limits       BALANCE  Static Sitting: Good  Dynamic Sitting: Good  Static Standing: Fair -  Dynamic Standing: Fair -    ADDITIONAL TESTS                                    ACTIVITY TOLERANCE                         O2 WALK       NEUROLOGICAL FINDINGS                        AM-PAC '6-Clicks' INPATIENT SHORT FORM - BASIC MOBILITY  How much difficulty does the patient currently have...  Patient Difficulty: Turning over in bed (including adjusting bedclothes, sheets and blankets)?: None   Patient Difficulty: Sitting down on and standing up from a chair with arms (e.g., wheelchair, bedside commode, etc.): None   Patient Difficulty: Moving from lying on back to sitting on the side of the bed?: A Little   How much help from another person does the patient currently need...   Help from Another: Moving to and from a bed to a chair (including a wheelchair)?: A Little   Help from Another: Need to walk in hospital room?: A Little   Help from Another: Climbing 3-5 steps with a railing?: A Little       AM-PAC Score:  Raw Score: 20   Approx Degree of Impairment: 35.83%   Standardized Score (AM-PAC Scale): 47.67   CMS Modifier (G-Code): CJ    FUNCTIONAL ABILITY STATUS  Gait Assessment   Functional Mobility/Gait Assessment  Gait Assistance: Contact guard assist  Distance (ft): 20  Assistive Device: Rolling walker  Pattern: Shuffle    Skilled Therapy Provided     Bed Mobility:  Rolling: ind  Supine to sit: supervision   Sit to supine: supervision     Transfer Mobility:  Sit to stand: supervision   Stand to sit: supervision  Gait = Ambulation as above.       Therapist's Comments: Pt encouraged to be OOB to chair.  Initially agreeable, then requires increased encouragement due to feeling cold.  Pt returned to bed at end of session.      Exercise/Education Provided:  Bed mobility  Body mechanics  Functional activity tolerated  Gait training  Transfer training    Patient End of Session: Up in chair;Needs met;RN aware of session/findings;Call light within reach;All patient questions and concerns addressed;Alarm set;Restraints;Family present;Discussed recommendations with /      Patient Evaluation Complexity Level:  History Low - no personal factors and/or co-morbidities   Examination of body systems Low -  addressing 1-2 elements   Clinical Presentation Low- Stable   Clinical Decision Making Low Complexity       PT Session Time: 15 minutes    Therapeutic Activity: 8 minutes

## 2024-09-16 NOTE — PLAN OF CARE
Problem: Patient/Family Goals  Goal: Patient/Family Long Term Goal  Description: Patient's Long Term Goal: discharge with appropriate resources    Interventions:  - comply with POC  - See additional Care Plan goals for specific interventions  Outcome: Progressing  Goal: Patient/Family Short Term Goal  Description: Patient's Short Term Goal:   9/15 noc: feel better  9/16: Remain comfortable    Interventions:   - PRN tylenol  - IVF  - See additional Care Plan goals for specific interventions  Outcome: Progressing

## 2024-09-16 NOTE — PLAN OF CARE
Alert x4. Room air at rest, audibly SOB after exertion, placed on 2L for comfort. Sats mid 90s at this time. NSR on tele. Voids. Up SBA w/ walker, unstable gait noted at times. PT to eval in AM. IVF infusing to L arm. C/o generalized pain and weakness, PRN tylenol given per pt request. Denies any c/o n/v/d. Updated on POC, no questions or concerns at this time. Verbalized understanding. Call light within reach.     Problem: Patient/Family Goals  Goal: Patient/Family Long Term Goal  Description: Patient's Long Term Goal: discharge with appropriate resources    Interventions:  - comply with POC  - See additional Care Plan goals for specific interventions  Outcome: Progressing  Goal: Patient/Family Short Term Goal  Description: Patient's Short Term Goal:   9/15 noc: feel better    Interventions:   - PRN tylenol  - IVF  - See additional Care Plan goals for specific interventions  Outcome: Progressing

## 2024-09-16 NOTE — PROGRESS NOTES
AO x4. RA. Tele NSR. Eliquis. Continent. Urinal. Up x1 walker. IV SL. Regular diet. PT/OT evaluate. Pt resting in bed with call light in reach. No further needs.

## 2024-09-17 VITALS
BODY MASS INDEX: 22.48 KG/M2 | WEIGHT: 157 LBS | TEMPERATURE: 98 F | HEART RATE: 63 BPM | DIASTOLIC BLOOD PRESSURE: 57 MMHG | OXYGEN SATURATION: 97 % | HEIGHT: 70 IN | RESPIRATION RATE: 18 BRPM | SYSTOLIC BLOOD PRESSURE: 141 MMHG

## 2024-09-17 RX ORDER — CLONIDINE HYDROCHLORIDE 0.2 MG/1
0.2 TABLET ORAL 2 TIMES DAILY
COMMUNITY
Start: 2024-09-06

## 2024-09-17 RX ORDER — FLUTICASONE FUROATE, UMECLIDINIUM BROMIDE AND VILANTEROL TRIFENATATE 200; 62.5; 25 UG/1; UG/1; UG/1
1 POWDER RESPIRATORY (INHALATION) DAILY
COMMUNITY
Start: 2024-07-11

## 2024-09-17 RX ORDER — TORSEMIDE 5 MG/1
5 TABLET ORAL DAILY
COMMUNITY

## 2024-09-17 NOTE — PLAN OF CARE
NURSING DISCHARGE NOTE    Discharged Home via Wheelchair.  Accompanied by Family member and Support staff  Belongings Taken by patient/family.      Discharge instructions explained to patient and family at bedside. IV removed. Patient's belongings taken by patient and family. No further questions at this time.     Problem: Patient/Family Goals  Goal: Patient/Family Long Term Goal  Description: Patient's Long Term Goal: discharge with appropriate resources    Interventions:  - comply with POC  - See additional Care Plan goals for specific interventions  Outcome: Progressing  Goal: Patient/Family Short Term Goal  Description: Patient's Short Term Goal:   9/15 noc: feel better  9/16: Remain comfortable  9/16 noc: control cough    Interventions:   - PRN tylenol  - IVF  - See additional Care Plan goals for specific interventions  Outcome: Progressing

## 2024-09-17 NOTE — CM/SW NOTE
Department  notified of request for HHC, aidin referrals started. Assigned CM/SW to follow up with pt/family on further discharge planning.     Megan Singh, DSC

## 2024-09-17 NOTE — DISCHARGE SUMMARY
[facility] [place] [facility] [place]   Patient ID:  Oswaldo Vang Jr.  [id number]  84 year old  2/12/1940    Admit date: 9/15/2024    Discharge date and time: 9/17/2024     Attending Physician: Alfredo Elizabeth MD     Primary Care Physician: Cecelia Baez MD     Admit Dx: Hypoxia [R09.02]  Community acquired pneumonia of right lower lobe of lung [J18.9]    Hospital Discharge Diagnoses:  COVID    Disposition: home, lives c son/wife    Important follow up:  -PCP in [x] within 7 days [] within 14 days [] other    No follow-up provider specified.    Hospital Course:   84 year old male with PMH sig for COPD, PAF on eliquis, s/p PPM, CKD 3, hypertension, hyperlipidemia, history of prostate cancer presented with altered mental status.     Acute COVID infection  - no indication for steroids  - no paxlovid due to CKD  - supportive cares  - gentle IVF completed  - low suspicion for bacterial superinfection, monitor off abx, no clear consolidation on CXR, was treated for PNA in ED prior to COVID resulting positive, procal low  - feeling well, no marked sob, can have close o/p f/u      COPD, stable  - not in exacerbation  - home inhalers with MDI     PAF  - diltiazem, metoprolol  - eliquis  - reports pacemaker?     Essential HTN  - hydralazine, metoprolol     CKD3  - mild fluids on admission  - Cr near baseline (1.9-2)      Hyperlipidemia  - statin     Hx prostate ca  - stable      FEN: regular diet, PT/OT  Proph: SCDs, eliquis  Code status: Full code     Day of discharge Exam   Vitals:    09/17/24 1130   BP: 141/57   Pulse: 63   Resp: 18   Temp: 97.8 °F (36.6 °C)       Exam on day of discharge:  Little sob but not bad. Walked. No f/c from his standpoint. Taking PO ok, no appetite.     Gen: No acute distress, alert and oriented  CV: RRR, +s1/s2  Lungs: CTAB, good respiratory effort  Abdomen: s/nt/nd  Ext: Moves all 4 extremities, no c/c/e  Neuro: CN Intact, no focal deficits      Discharge meds     Medication List         CONTINUE taking these medications      albuterol 108 (90 Base) MCG/ACT Aers  Commonly known as: Ventolin HFA     Budesonide-Formoterol Fumarate 160-4.5 MCG/ACT Aero  Commonly known as: SYMBICORT     CALCIUM OR     Cardizem  MG Cp24  Generic drug: dilTIAZem ER     Eliquis 2.5 MG Tabs  Generic drug: apixaban  TAKE 1 TABLET BY MOUTH TWICE A DAY     ferrous sulfate 325 (65 FE) MG Tbec  Take 1 tablet (325 mg total) by mouth daily with breakfast.     hydrALAZINE 25 MG Tabs  Commonly known as: Apresoline     latanoprost 0.005 % Soln  Commonly known as: Xalatan     losartan 25 MG Tabs  Commonly known as: Cozaar     metoprolol tartrate 50 MG Tabs  Commonly known as: Lopressor     MULTIVITAMIN ADULTS OR     omeprazole 20 MG Cpdr  Commonly known as: PriLOSEC     Spiriva Respimat 2.5 MCG/ACT Aers  Generic drug: Tiotropium Bromide Monohydrate  Inhale 2 puffs into the lungs daily.     VITAMIN C OR              Consults: IP CONSULT TO HOSPITALIST  IP CONSULT TO SOCIAL WORK    Radiology: XR CHEST AP PORTABLE  (CPT=71045)    Result Date: 9/15/2024  PROCEDURE:  XR CHEST AP PORTABLE  (CPT=71045)  TECHNIQUE:  AP chest radiograph was obtained.  COMPARISON:  [facility] , XR, XR CHEST AP PORTABLE  (CPT=71045), 5/20/2023, 11:06 PM.  INDICATIONS:  DOMINGO  PATIENT STATED HISTORY: (As transcribed by Technologist)  Patient stated he has been having difficulty breathing since last night. States he has a history of COPD.               CONCLUSION:   Stable cardiac and mediastinal contours.  Bibasilar interstitial fibrotic changes noted without pulmonary edema or focal airspace consolidation.  The pleural spaces are clear.  No acute osseous findings.   LOCATION:  [facility]      Dictated by (CST): Ezio Vargas MD on 9/15/2024 at 8:34 AM     Finalized by (CST): Ezio Vargas MD on 9/15/2024 at 8:35 AM          Operative Procedures:      Code Status: DNAR/Selective Treatment    Total Time Coordinating Care: Greater than 30 minutes    Patient had  opportunity to ask questions and state understand and agree with therapeutic plan as outlined. I reviewed and reconciled current and discharge medications on day of discharge and discussed meds with patient. D/w RN. Reviewed chart including previous progress notes      Alfredo Elizabeth MD  [facility] Hospitalist  [phone]  9/17/2024  12:50 PM

## 2024-09-17 NOTE — PLAN OF CARE
Alert x4. Room air at rest, 2LNC with ambulation for comfort and c/o some dyspnea. Sats WNL. Denies any complaints at rest. Comfortable at this time. C/o throat irritation- PRN lozenge given per pt request to ease discomfort. Relief met. Voids. Up SBA w/ walker. Possible discharge in AM. Call light within reach.     Problem: Patient/Family Goals  Goal: Patient/Family Long Term Goal  Description: Patient's Long Term Goal: discharge with appropriate resources    Interventions:  - comply with POC  - See additional Care Plan goals for specific interventions  Outcome: Progressing  Goal: Patient/Family Short Term Goal  Description: Patient's Short Term Goal:   9/15 noc: feel better  9/16: Remain comfortable  9/16 noc: control cough    Interventions:   - PRN tylenol  - IVF  - See additional Care Plan goals for specific interventions  Outcome: Progressing

## 2024-09-18 NOTE — CM/SW NOTE
09/18/24 1100   Discharge disposition   Expected discharge disposition Home-Health   Post Acute Care Provider [facility]

## 2024-09-18 NOTE — HOME CARE LIAISON
[facility] Spoke w/ patient who is agreeable with [facility]. Contact information placed on AVS.

## (undated) DEVICE — SYRINGE 30ML LL TIP

## (undated) DEVICE — SLEEVE KENDALL SCD EXPRESS MED

## (undated) DEVICE — BAG DRAIN INFECTION CNTRL 2000

## (undated) DEVICE — SOLUTION  .9 3000ML

## (undated) DEVICE — CHEMOTHERAPY CONTAINER,SLIDE LID, YELLOW: Brand: SHARPSAFETY

## (undated) DEVICE — CATH PLUG & CAP STERILE

## (undated) DEVICE — SYRINGE,TOOMEY,IRRIGATION,70CC,STERILE: Brand: MEDLINE

## (undated) DEVICE — CYSTO CDS-LF: Brand: MEDLINE INDUSTRIES, INC.

## (undated) DEVICE — MEGADYNE ELECTRODE ADULT PT RT

## (undated) DEVICE — ZIPWIRE GUIDEWIRE 035X150 STR

## (undated) DEVICE — STERILE POLYISOPRENE POWDER-FREE SURGICAL GLOVES: Brand: PROTEXIS

## (undated) DEVICE — HF-RESECTION ELECTRODE PLASMALOOP LOOP, MEDIUM, 24 FR., 12°/16°, ESG TURIS: Brand: OLYMPUS

## (undated) DEVICE — STERILE WATER 1000ML BTL

## (undated) DEVICE — TIGERTAIL 5F FLXTIP 70CM

## (undated) NOTE — LETTER
Patient Name: Irvin Granados. : 1940       Medical Record #: [id number]    CONSENT FOR PROCEDURES/SEDATION    Date: 2021       Time: 6:33 PM        1.  I authorize the performance upon Irvin Granados. the following:    ___________Pacem

## (undated) NOTE — LETTER
[facility] Cardiac Device Communication Tool    Preop to complete    [address] Phone: [phone] Fax: [phone]   Patient Name Elinor HONEYCUTT   Patient  - AGE - SEX 1940 - A: 79 y - male   Surgical Date 2023   Surgical Procedure CYSTOSCOPY, TRANSURETHRAL RESECTION OF BLADDER TUMOR, INSTILLATION OF MEDICATION, GEMCITIBINE   Surgical Location [facility]   Type of cautery anticipated: Monopolar   Prone Position      Device Clinic to Complete the Information Below, Sign and Fax to [phone]    Pacemaker or ICD    Atrial or atrial-ventricular lead?     Indication for device    Is patient pacemaker dependent? Has pt had routine f/u and is battery life > 3 months    Is ICD programmed to inhibit therapy w/magnet? Does device have rate response or other sensor?       Surgical Procedure above [facility] Surgical Procedure @ [place] [place] and below   < 6 in. from ICD: Reprogram therapies OFF w/  asynchronous pacing if PM dependent  < 6 in. from PM: Reprogram asynchronous if PM   dependent ICD: No Change  PM: No Change   > 6 in. from ICD: Magnet*  > 6 in. from PM:  No Change*  * If PM dependent observe for pacing inhibition and minimize cautery if inhibition is seen                                              Bipolar cautery: No Change   Cardiac Device Management Plan (check one)            ___  Reprogram (PAT Dept to provide Rep w/ arrival date/time)   ___ Magnet    ___ No Change    Comments: ___________________________________________________________________        Signature: ________________________________ Date: ____________ Time: ______________     Print Name: ___________________________________

## (undated) NOTE — LETTER
OUTSIDE TESTING RESULT REQUEST     IMPORTANT: FOR YOUR IMMEDIATE ATTENTION  Please FAX all test results listed below to: [phone]     Testing already done on or about: 5/10/23    * * * * If testing is NOT complete, arrange with patient A.S.A.P. * * * *      Patient Name: Graciela Patel  Surgery Date: 2023  Medical Record: [id number]  CSN: [id number]  : 1940 - A: 79 y     Sex: male  Surgeon(s):  Kathi Chavez MD  Procedure: CYSTOSCOPY, TRANSURETHRAL RESECTION OF BLADDER TUMOR, INSTILLATION OF MEDICATION, GEMCITIBINE  Anesthesia Type: General     Surgeon: Kathi Chavez MD     The following Testing and Time Line are REQUIRED PER ANESTHESIA     EKG READ AND SIGNED WITHIN   90 days  BMP (requires 4 hour fast) within  60 days      Thank You,   Sent by: Melissa BINGHAM

## (undated) NOTE — Clinical Note
Reached patient for TCM call. The patient is scheduled with the TCC for a virtual visit on 9/10/2021. The patient declined full medication review and complete TCM FU, but did report symptom improvement at home. Thank you.

## (undated) NOTE — LETTER
Patient Name: Matt Scale / Sex: 2/12/1940-A: 79 y  male   Medical Records: [id number] CSN: [id number]    ABNORMAL VALUES  Surgeon(s):  Juni Power MD  Anesthesia Type: General  Date of Surgery: 5/18/2023  Procedure Description: CYSTOSCOPY, TRANSURETHRAL RESECTION OF BLADDER TUMOR, INSTILLATION OF MEDICATION, [address]  Primary Surgeon:  Juni Power MD  Phone Number: [phone]    PLEASE NOTE THE FOLLOWING ABNORMALITIES:   Chemistry  ; 1.9 elevated  ________________________________________________________  Anesthesia to review patient's chart